# Patient Record
Sex: MALE | Race: WHITE | Employment: OTHER | URBAN - METROPOLITAN AREA
[De-identification: names, ages, dates, MRNs, and addresses within clinical notes are randomized per-mention and may not be internally consistent; named-entity substitution may affect disease eponyms.]

---

## 2017-12-05 PROBLEM — C61 PROSTATE CANCER (HCC): Status: ACTIVE | Noted: 2017-12-05

## 2018-01-11 ENCOUNTER — HOSPITAL ENCOUNTER (OUTPATIENT)
Dept: RADIATION THERAPY | Age: 63
Discharge: HOME | End: 2018-01-11
Payer: OTHER GOVERNMENT

## 2018-01-11 PROCEDURE — 99211 OFF/OP EST MAY X REQ PHY/QHP: CPT

## 2018-02-06 ENCOUNTER — ANESTHESIA EVENT (OUTPATIENT)
Dept: SURGERY | Age: 63
DRG: 708 | End: 2018-02-06
Payer: OTHER GOVERNMENT

## 2018-02-07 ENCOUNTER — ANESTHESIA (OUTPATIENT)
Dept: SURGERY | Age: 63
DRG: 708 | End: 2018-02-07
Payer: OTHER GOVERNMENT

## 2018-02-07 ENCOUNTER — HOSPITAL ENCOUNTER (INPATIENT)
Age: 63
LOS: 1 days | Discharge: HOME | DRG: 708 | End: 2018-02-08
Attending: UROLOGY | Admitting: UROLOGY
Payer: OTHER GOVERNMENT

## 2018-02-07 DIAGNOSIS — C61 PROSTATE CANCER (HCC): Primary | ICD-10-CM

## 2018-02-07 PROCEDURE — 77030032490 HC SLV COMPR SCD KNE COVD -B: Performed by: UROLOGY

## 2018-02-07 PROCEDURE — 77030034696 HC CATH URETH FOL 2W BARD -A: Performed by: UROLOGY

## 2018-02-07 PROCEDURE — 77030035045 HC TRCR ENDOSC VRSPRT BLDLSS COVD -B: Performed by: UROLOGY

## 2018-02-07 PROCEDURE — 74011000250 HC RX REV CODE- 250: Performed by: UROLOGY

## 2018-02-07 PROCEDURE — 77030016151 HC PROTCTR LNS DFOG COVD -B: Performed by: UROLOGY

## 2018-02-07 PROCEDURE — 88309 TISSUE EXAM BY PATHOLOGIST: CPT | Performed by: UROLOGY

## 2018-02-07 PROCEDURE — 77030010935 HC CLP LIG ABSRB TELE -B: Performed by: UROLOGY

## 2018-02-07 PROCEDURE — 77030008771 HC TU NG SALEM SUMP -A: Performed by: ANESTHESIOLOGY

## 2018-02-07 PROCEDURE — 77030010507 HC ADH SKN DERMBND J&J -B: Performed by: UROLOGY

## 2018-02-07 PROCEDURE — 74011000250 HC RX REV CODE- 250

## 2018-02-07 PROCEDURE — 74011250636 HC RX REV CODE- 250/636: Performed by: UROLOGY

## 2018-02-07 PROCEDURE — 77030035048 HC TRCR ENDOSC OPTCL COVD -B: Performed by: UROLOGY

## 2018-02-07 PROCEDURE — 76010000880 HC OR TIME 4 TO 4.5HR INTENSV - TIER 2: Performed by: UROLOGY

## 2018-02-07 PROCEDURE — 77030018813 HC SCIS LAPSCP EPIX DISP AMR -B: Performed by: UROLOGY

## 2018-02-07 PROCEDURE — 0TQC4ZZ REPAIR BLADDER NECK, PERCUTANEOUS ENDOSCOPIC APPROACH: ICD-10-PCS | Performed by: UROLOGY

## 2018-02-07 PROCEDURE — 77030002933 HC SUT MCRYL J&J -A: Performed by: UROLOGY

## 2018-02-07 PROCEDURE — 07BC4ZZ EXCISION OF PELVIS LYMPHATIC, PERCUTANEOUS ENDOSCOPIC APPROACH: ICD-10-PCS | Performed by: UROLOGY

## 2018-02-07 PROCEDURE — 74011250636 HC RX REV CODE- 250/636

## 2018-02-07 PROCEDURE — 77030003028 HC SUT VCRL J&J -A: Performed by: UROLOGY

## 2018-02-07 PROCEDURE — 77030013079 HC BLNKT BAIR HGGR 3M -A: Performed by: ANESTHESIOLOGY

## 2018-02-07 PROCEDURE — 77030011640 HC PAD GRND REM COVD -A: Performed by: UROLOGY

## 2018-02-07 PROCEDURE — 77030010545: Performed by: UROLOGY

## 2018-02-07 PROCEDURE — 77030010939 HC CLP LIG TELE -B: Performed by: UROLOGY

## 2018-02-07 PROCEDURE — 77030035277 HC OBTRTR BLDELSS DISP INTU -B: Performed by: UROLOGY

## 2018-02-07 PROCEDURE — 0VBQ4ZZ EXCISION OF BILATERAL VAS DEFERENS, PERCUTANEOUS ENDOSCOPIC APPROACH: ICD-10-PCS | Performed by: UROLOGY

## 2018-02-07 PROCEDURE — 76210000000 HC OR PH I REC 2 TO 2.5 HR: Performed by: UROLOGY

## 2018-02-07 PROCEDURE — 77030020263 HC SOL INJ SOD CL0.9% LFCR 1000ML: Performed by: UROLOGY

## 2018-02-07 PROCEDURE — 74011250637 HC RX REV CODE- 250/637: Performed by: UROLOGY

## 2018-02-07 PROCEDURE — 8E0W4CZ ROBOTIC ASSISTED PROCEDURE OF TRUNK REGION, PERCUTANEOUS ENDOSCOPIC APPROACH: ICD-10-PCS | Performed by: UROLOGY

## 2018-02-07 PROCEDURE — 74011250636 HC RX REV CODE- 250/636: Performed by: NURSE ANESTHETIST, CERTIFIED REGISTERED

## 2018-02-07 PROCEDURE — 77030020255 HC SOL INJ LR 1000ML BG

## 2018-02-07 PROCEDURE — 77030008477 HC STYL SATN SLP COVD -A: Performed by: ANESTHESIOLOGY

## 2018-02-07 PROCEDURE — 74011250637 HC RX REV CODE- 250/637: Performed by: NURSE ANESTHETIST, CERTIFIED REGISTERED

## 2018-02-07 PROCEDURE — 77030027138 HC INCENT SPIROMETER -A: Performed by: UROLOGY

## 2018-02-07 PROCEDURE — 76060000039 HC ANESTHESIA 4 TO 4.5 HR: Performed by: UROLOGY

## 2018-02-07 PROCEDURE — 77030009852 HC PCH RTVR ENDOSC COVD -B: Performed by: UROLOGY

## 2018-02-07 PROCEDURE — 77030002966 HC SUT PDS J&J -A: Performed by: UROLOGY

## 2018-02-07 PROCEDURE — 77030031139 HC SUT VCRL2 J&J -A: Performed by: UROLOGY

## 2018-02-07 PROCEDURE — 0VT04ZZ RESECTION OF PROSTATE, PERCUTANEOUS ENDOSCOPIC APPROACH: ICD-10-PCS | Performed by: UROLOGY

## 2018-02-07 PROCEDURE — 77030022704 HC SUT VLOC COVD -B: Performed by: UROLOGY

## 2018-02-07 PROCEDURE — 65270000029 HC RM PRIVATE

## 2018-02-07 PROCEDURE — 77030010513 HC APPL CLP LIG J&J -C: Performed by: UROLOGY

## 2018-02-07 PROCEDURE — 77030013449 HC CLP LIG TELE -A: Performed by: UROLOGY

## 2018-02-07 PROCEDURE — 88305 TISSUE EXAM BY PATHOLOGIST: CPT | Performed by: UROLOGY

## 2018-02-07 PROCEDURE — 77030035050: Performed by: UROLOGY

## 2018-02-07 PROCEDURE — 0VT34ZZ RESECTION OF BILATERAL SEMINAL VESICLES, PERCUTANEOUS ENDOSCOPIC APPROACH: ICD-10-PCS | Performed by: UROLOGY

## 2018-02-07 PROCEDURE — 77030008683 HC TU ET CUF COVD -A: Performed by: ANESTHESIOLOGY

## 2018-02-07 PROCEDURE — 77030018846 HC SOL IRR STRL H20 ICUM -A: Performed by: UROLOGY

## 2018-02-07 RX ORDER — HYDROMORPHONE HYDROCHLORIDE 2 MG/ML
INJECTION, SOLUTION INTRAMUSCULAR; INTRAVENOUS; SUBCUTANEOUS AS NEEDED
Status: DISCONTINUED | OUTPATIENT
Start: 2018-02-07 | End: 2018-02-07 | Stop reason: HOSPADM

## 2018-02-07 RX ORDER — OXYBUTYNIN CHLORIDE 5 MG/1
5 TABLET ORAL
Status: DISCONTINUED | OUTPATIENT
Start: 2018-02-07 | End: 2018-02-08 | Stop reason: HOSPADM

## 2018-02-07 RX ORDER — BUPIVACAINE HYDROCHLORIDE 2.5 MG/ML
INJECTION, SOLUTION EPIDURAL; INFILTRATION; INTRACAUDAL AS NEEDED
Status: DISCONTINUED | OUTPATIENT
Start: 2018-02-07 | End: 2018-02-07 | Stop reason: HOSPADM

## 2018-02-07 RX ORDER — FENTANYL CITRATE 50 UG/ML
25 INJECTION, SOLUTION INTRAMUSCULAR; INTRAVENOUS
Status: DISCONTINUED | OUTPATIENT
Start: 2018-02-07 | End: 2018-02-07

## 2018-02-07 RX ORDER — ONDANSETRON 2 MG/ML
4 INJECTION INTRAMUSCULAR; INTRAVENOUS
Status: DISCONTINUED | OUTPATIENT
Start: 2018-02-07 | End: 2018-02-07

## 2018-02-07 RX ORDER — ATORVASTATIN CALCIUM 10 MG/1
TABLET, FILM COATED ORAL DAILY
COMMUNITY

## 2018-02-07 RX ORDER — SODIUM CHLORIDE 0.9 % (FLUSH) 0.9 %
5-10 SYRINGE (ML) INJECTION EVERY 8 HOURS
Status: DISCONTINUED | OUTPATIENT
Start: 2018-02-07 | End: 2018-02-08 | Stop reason: HOSPADM

## 2018-02-07 RX ORDER — OXYCODONE AND ACETAMINOPHEN 5; 325 MG/1; MG/1
1 TABLET ORAL AS NEEDED
Status: DISCONTINUED | OUTPATIENT
Start: 2018-02-07 | End: 2018-02-07

## 2018-02-07 RX ORDER — ONDANSETRON 2 MG/ML
INJECTION INTRAMUSCULAR; INTRAVENOUS AS NEEDED
Status: DISCONTINUED | OUTPATIENT
Start: 2018-02-07 | End: 2018-02-07 | Stop reason: HOSPADM

## 2018-02-07 RX ORDER — DOCUSATE SODIUM 100 MG/1
100 CAPSULE, LIQUID FILLED ORAL DAILY
Qty: 30 CAP | Refills: 0 | Status: SHIPPED | OUTPATIENT
Start: 2018-02-07 | End: 2018-03-09

## 2018-02-07 RX ORDER — NALOXONE HYDROCHLORIDE 0.4 MG/ML
0.4 INJECTION, SOLUTION INTRAMUSCULAR; INTRAVENOUS; SUBCUTANEOUS AS NEEDED
Status: DISCONTINUED | OUTPATIENT
Start: 2018-02-07 | End: 2018-02-08 | Stop reason: HOSPADM

## 2018-02-07 RX ORDER — FAMOTIDINE 20 MG/1
20 TABLET, FILM COATED ORAL ONCE
Status: COMPLETED | OUTPATIENT
Start: 2018-02-07 | End: 2018-02-07

## 2018-02-07 RX ORDER — METOPROLOL TARTRATE 5 MG/5ML
5 INJECTION INTRAVENOUS
Status: DISCONTINUED | OUTPATIENT
Start: 2018-02-07 | End: 2018-02-08 | Stop reason: HOSPADM

## 2018-02-07 RX ORDER — DIPHENHYDRAMINE HYDROCHLORIDE 50 MG/ML
25 INJECTION, SOLUTION INTRAMUSCULAR; INTRAVENOUS
Status: DISCONTINUED | OUTPATIENT
Start: 2018-02-07 | End: 2018-02-07

## 2018-02-07 RX ORDER — HEPARIN SODIUM 5000 [USP'U]/ML
5000 INJECTION, SOLUTION INTRAVENOUS; SUBCUTANEOUS EVERY 8 HOURS
Status: DISCONTINUED | OUTPATIENT
Start: 2018-02-07 | End: 2018-02-08 | Stop reason: HOSPADM

## 2018-02-07 RX ORDER — DEXAMETHASONE SODIUM PHOSPHATE 4 MG/ML
INJECTION, SOLUTION INTRA-ARTICULAR; INTRALESIONAL; INTRAMUSCULAR; INTRAVENOUS; SOFT TISSUE AS NEEDED
Status: DISCONTINUED | OUTPATIENT
Start: 2018-02-07 | End: 2018-02-07 | Stop reason: HOSPADM

## 2018-02-07 RX ORDER — SODIUM CHLORIDE, SODIUM LACTATE, POTASSIUM CHLORIDE, CALCIUM CHLORIDE 600; 310; 30; 20 MG/100ML; MG/100ML; MG/100ML; MG/100ML
INJECTION, SOLUTION INTRAVENOUS
Status: DISCONTINUED | OUTPATIENT
Start: 2018-02-07 | End: 2018-02-07 | Stop reason: HOSPADM

## 2018-02-07 RX ORDER — DIPHENHYDRAMINE HCL 25 MG
25 CAPSULE ORAL
Status: DISCONTINUED | OUTPATIENT
Start: 2018-02-07 | End: 2018-02-08 | Stop reason: HOSPADM

## 2018-02-07 RX ORDER — ONDANSETRON 2 MG/ML
4 INJECTION INTRAMUSCULAR; INTRAVENOUS
Status: DISCONTINUED | OUTPATIENT
Start: 2018-02-07 | End: 2018-02-08 | Stop reason: HOSPADM

## 2018-02-07 RX ORDER — OXYBUTYNIN CHLORIDE 5 MG/1
5 TABLET ORAL
Qty: 20 TAB | Refills: 0 | Status: SHIPPED | OUTPATIENT
Start: 2018-02-07 | End: 2018-03-19

## 2018-02-07 RX ORDER — OXYCODONE AND ACETAMINOPHEN 5; 325 MG/1; MG/1
1 TABLET ORAL
Qty: 30 TAB | Refills: 0 | Status: SHIPPED | OUTPATIENT
Start: 2018-02-07 | End: 2018-03-19

## 2018-02-07 RX ORDER — LIDOCAINE HYDROCHLORIDE 10 MG/ML
0.1 INJECTION, SOLUTION EPIDURAL; INFILTRATION; INTRACAUDAL; PERINEURAL AS NEEDED
Status: DISCONTINUED | OUTPATIENT
Start: 2018-02-07 | End: 2018-02-07 | Stop reason: HOSPADM

## 2018-02-07 RX ORDER — HYDROCODONE BITARTRATE AND ACETAMINOPHEN 5; 325 MG/1; MG/1
1-2 TABLET ORAL
Status: DISCONTINUED | OUTPATIENT
Start: 2018-02-07 | End: 2018-02-08 | Stop reason: HOSPADM

## 2018-02-07 RX ORDER — HYDROMORPHONE HYDROCHLORIDE 1 MG/ML
.5-1 INJECTION, SOLUTION INTRAMUSCULAR; INTRAVENOUS; SUBCUTANEOUS
Status: DISCONTINUED | OUTPATIENT
Start: 2018-02-07 | End: 2018-02-08 | Stop reason: HOSPADM

## 2018-02-07 RX ORDER — SODIUM CHLORIDE, SODIUM LACTATE, POTASSIUM CHLORIDE, CALCIUM CHLORIDE 600; 310; 30; 20 MG/100ML; MG/100ML; MG/100ML; MG/100ML
50 INJECTION, SOLUTION INTRAVENOUS CONTINUOUS
Status: DISCONTINUED | OUTPATIENT
Start: 2018-02-07 | End: 2018-02-07

## 2018-02-07 RX ORDER — HYDROMORPHONE HYDROCHLORIDE 2 MG/ML
0.5 INJECTION, SOLUTION INTRAMUSCULAR; INTRAVENOUS; SUBCUTANEOUS
Status: DISCONTINUED | OUTPATIENT
Start: 2018-02-07 | End: 2018-02-07

## 2018-02-07 RX ORDER — GLYCOPYRROLATE 0.2 MG/ML
INJECTION INTRAMUSCULAR; INTRAVENOUS AS NEEDED
Status: DISCONTINUED | OUTPATIENT
Start: 2018-02-07 | End: 2018-02-07 | Stop reason: HOSPADM

## 2018-02-07 RX ORDER — LISINOPRIL AND HYDROCHLOROTHIAZIDE 20; 25 MG/1; MG/1
1 TABLET ORAL DAILY
COMMUNITY
End: 2021-02-03

## 2018-02-07 RX ORDER — LIDOCAINE HYDROCHLORIDE 20 MG/ML
INJECTION, SOLUTION EPIDURAL; INFILTRATION; INTRACAUDAL; PERINEURAL AS NEEDED
Status: DISCONTINUED | OUTPATIENT
Start: 2018-02-07 | End: 2018-02-07 | Stop reason: HOSPADM

## 2018-02-07 RX ORDER — SODIUM CHLORIDE 0.9 % (FLUSH) 0.9 %
5-10 SYRINGE (ML) INJECTION AS NEEDED
Status: DISCONTINUED | OUTPATIENT
Start: 2018-02-07 | End: 2018-02-08 | Stop reason: HOSPADM

## 2018-02-07 RX ORDER — OXYBUTYNIN CHLORIDE 5 MG/1
5 TABLET ORAL
Status: DISCONTINUED | OUTPATIENT
Start: 2018-02-07 | End: 2018-02-07 | Stop reason: SDUPTHER

## 2018-02-07 RX ORDER — NEOSTIGMINE METHYLSULFATE 5 MG/5 ML
SYRINGE (ML) INTRAVENOUS AS NEEDED
Status: DISCONTINUED | OUTPATIENT
Start: 2018-02-07 | End: 2018-02-07 | Stop reason: HOSPADM

## 2018-02-07 RX ORDER — SUCCINYLCHOLINE CHLORIDE 20 MG/ML
INJECTION INTRAMUSCULAR; INTRAVENOUS AS NEEDED
Status: DISCONTINUED | OUTPATIENT
Start: 2018-02-07 | End: 2018-02-07 | Stop reason: HOSPADM

## 2018-02-07 RX ORDER — SODIUM CHLORIDE, SODIUM LACTATE, POTASSIUM CHLORIDE, CALCIUM CHLORIDE 600; 310; 30; 20 MG/100ML; MG/100ML; MG/100ML; MG/100ML
125 INJECTION, SOLUTION INTRAVENOUS CONTINUOUS
Status: DISCONTINUED | OUTPATIENT
Start: 2018-02-07 | End: 2018-02-08 | Stop reason: HOSPADM

## 2018-02-07 RX ORDER — HYDROMORPHONE HYDROCHLORIDE 2 MG/ML
INJECTION, SOLUTION INTRAMUSCULAR; INTRAVENOUS; SUBCUTANEOUS
Status: COMPLETED
Start: 2018-02-07 | End: 2018-02-07

## 2018-02-07 RX ORDER — MIDAZOLAM HYDROCHLORIDE 1 MG/ML
INJECTION, SOLUTION INTRAMUSCULAR; INTRAVENOUS AS NEEDED
Status: DISCONTINUED | OUTPATIENT
Start: 2018-02-07 | End: 2018-02-07 | Stop reason: HOSPADM

## 2018-02-07 RX ORDER — VECURONIUM BROMIDE FOR INJECTION 1 MG/ML
INJECTION, POWDER, LYOPHILIZED, FOR SOLUTION INTRAVENOUS AS NEEDED
Status: DISCONTINUED | OUTPATIENT
Start: 2018-02-07 | End: 2018-02-07 | Stop reason: HOSPADM

## 2018-02-07 RX ORDER — PROPOFOL 10 MG/ML
INJECTION, EMULSION INTRAVENOUS AS NEEDED
Status: DISCONTINUED | OUTPATIENT
Start: 2018-02-07 | End: 2018-02-07 | Stop reason: HOSPADM

## 2018-02-07 RX ORDER — FENTANYL CITRATE 50 UG/ML
INJECTION, SOLUTION INTRAMUSCULAR; INTRAVENOUS AS NEEDED
Status: DISCONTINUED | OUTPATIENT
Start: 2018-02-07 | End: 2018-02-07 | Stop reason: HOSPADM

## 2018-02-07 RX ORDER — SODIUM CHLORIDE, SODIUM LACTATE, POTASSIUM CHLORIDE, CALCIUM CHLORIDE 600; 310; 30; 20 MG/100ML; MG/100ML; MG/100ML; MG/100ML
25 INJECTION, SOLUTION INTRAVENOUS CONTINUOUS
Status: DISCONTINUED | OUTPATIENT
Start: 2018-02-07 | End: 2018-02-07 | Stop reason: HOSPADM

## 2018-02-07 RX ADMIN — VECURONIUM BROMIDE FOR INJECTION 5 MG: 1 INJECTION, POWDER, LYOPHILIZED, FOR SOLUTION INTRAVENOUS at 09:55

## 2018-02-07 RX ADMIN — FAMOTIDINE 20 MG: 20 TABLET, FILM COATED ORAL at 08:00

## 2018-02-07 RX ADMIN — VECURONIUM BROMIDE FOR INJECTION 1 MG: 1 INJECTION, POWDER, LYOPHILIZED, FOR SOLUTION INTRAVENOUS at 10:47

## 2018-02-07 RX ADMIN — OXYBUTYNIN CHLORIDE 5 MG: 5 TABLET ORAL at 23:23

## 2018-02-07 RX ADMIN — FENTANYL CITRATE 50 MCG: 50 INJECTION, SOLUTION INTRAMUSCULAR; INTRAVENOUS at 10:24

## 2018-02-07 RX ADMIN — SODIUM CHLORIDE, SODIUM LACTATE, POTASSIUM CHLORIDE, CALCIUM CHLORIDE: 600; 310; 30; 20 INJECTION, SOLUTION INTRAVENOUS at 09:50

## 2018-02-07 RX ADMIN — GLYCOPYRROLATE 0.2 MG: 0.2 INJECTION INTRAMUSCULAR; INTRAVENOUS at 10:07

## 2018-02-07 RX ADMIN — FENTANYL CITRATE 50 MCG: 50 INJECTION, SOLUTION INTRAMUSCULAR; INTRAVENOUS at 10:30

## 2018-02-07 RX ADMIN — WATER 1 G: 1 INJECTION INTRAMUSCULAR; INTRAVENOUS; SUBCUTANEOUS at 16:57

## 2018-02-07 RX ADMIN — PROPOFOL 150 MG: 10 INJECTION, EMULSION INTRAVENOUS at 09:47

## 2018-02-07 RX ADMIN — HEPARIN SODIUM 5000 UNITS: 5000 INJECTION, SOLUTION INTRAVENOUS; SUBCUTANEOUS at 15:52

## 2018-02-07 RX ADMIN — HYDROCODONE BITARTRATE AND ACETAMINOPHEN 2 TABLET: 5; 325 TABLET ORAL at 20:11

## 2018-02-07 RX ADMIN — HYDROCODONE BITARTRATE AND ACETAMINOPHEN 1 TABLET: 5; 325 TABLET ORAL at 23:23

## 2018-02-07 RX ADMIN — Medication 2 MG: at 13:13

## 2018-02-07 RX ADMIN — LIDOCAINE HYDROCHLORIDE 60 MG: 20 INJECTION, SOLUTION EPIDURAL; INFILTRATION; INTRACAUDAL; PERINEURAL at 09:47

## 2018-02-07 RX ADMIN — Medication 10 ML: at 16:57

## 2018-02-07 RX ADMIN — GLYCOPYRROLATE 0.2 MG: 0.2 INJECTION INTRAMUSCULAR; INTRAVENOUS at 13:13

## 2018-02-07 RX ADMIN — DEXAMETHASONE SODIUM PHOSPHATE 4 MG: 4 INJECTION, SOLUTION INTRA-ARTICULAR; INTRALESIONAL; INTRAMUSCULAR; INTRAVENOUS; SOFT TISSUE at 10:00

## 2018-02-07 RX ADMIN — HYDROMORPHONE HYDROCHLORIDE 1 MG: 2 INJECTION, SOLUTION INTRAMUSCULAR; INTRAVENOUS; SUBCUTANEOUS at 10:55

## 2018-02-07 RX ADMIN — FENTANYL CITRATE 100 MCG: 50 INJECTION, SOLUTION INTRAMUSCULAR; INTRAVENOUS at 09:47

## 2018-02-07 RX ADMIN — WATER 1 G: 1 INJECTION INTRAMUSCULAR; INTRAVENOUS; SUBCUTANEOUS at 22:19

## 2018-02-07 RX ADMIN — MIDAZOLAM HYDROCHLORIDE 2 MG: 1 INJECTION, SOLUTION INTRAMUSCULAR; INTRAVENOUS at 09:37

## 2018-02-07 RX ADMIN — SUCCINYLCHOLINE CHLORIDE 100 MG: 20 INJECTION INTRAMUSCULAR; INTRAVENOUS at 09:47

## 2018-02-07 RX ADMIN — SODIUM CHLORIDE, SODIUM LACTATE, POTASSIUM CHLORIDE, AND CALCIUM CHLORIDE 125 ML/HR: 600; 310; 30; 20 INJECTION, SOLUTION INTRAVENOUS at 15:51

## 2018-02-07 RX ADMIN — VECURONIUM BROMIDE FOR INJECTION 1 MG: 1 INJECTION, POWDER, LYOPHILIZED, FOR SOLUTION INTRAVENOUS at 11:30

## 2018-02-07 RX ADMIN — HYDROMORPHONE HYDROCHLORIDE 0.5 MG: 2 INJECTION INTRAMUSCULAR; INTRAVENOUS; SUBCUTANEOUS at 13:49

## 2018-02-07 RX ADMIN — WATER 2 G: 1 INJECTION INTRAMUSCULAR; INTRAVENOUS; SUBCUTANEOUS at 10:00

## 2018-02-07 RX ADMIN — HYDROMORPHONE HYDROCHLORIDE 1 MG: 2 INJECTION, SOLUTION INTRAMUSCULAR; INTRAVENOUS; SUBCUTANEOUS at 10:06

## 2018-02-07 RX ADMIN — SODIUM CHLORIDE, POTASSIUM CHLORIDE, SODIUM LACTATE AND CALCIUM CHLORIDE 25 ML/HR: 600; 310; 30; 20 INJECTION, SOLUTION INTRAVENOUS at 08:22

## 2018-02-07 RX ADMIN — ONDANSETRON 4 MG: 2 INJECTION INTRAMUSCULAR; INTRAVENOUS at 18:45

## 2018-02-07 RX ADMIN — HYDROMORPHONE HYDROCHLORIDE 0.5 MG: 2 INJECTION, SOLUTION INTRAMUSCULAR; INTRAVENOUS; SUBCUTANEOUS at 14:00

## 2018-02-07 RX ADMIN — HEPARIN SODIUM 5000 UNITS: 5000 INJECTION, SOLUTION INTRAVENOUS; SUBCUTANEOUS at 23:22

## 2018-02-07 RX ADMIN — HYDROMORPHONE HYDROCHLORIDE 0.5 MG: 2 INJECTION, SOLUTION INTRAMUSCULAR; INTRAVENOUS; SUBCUTANEOUS at 13:49

## 2018-02-07 RX ADMIN — ONDANSETRON 4 MG: 2 INJECTION INTRAMUSCULAR; INTRAVENOUS at 13:09

## 2018-02-07 RX ADMIN — HYDROCODONE BITARTRATE AND ACETAMINOPHEN 2 TABLET: 5; 325 TABLET ORAL at 15:51

## 2018-02-07 RX ADMIN — HYDROMORPHONE HYDROCHLORIDE 0.5 MG: 2 INJECTION, SOLUTION INTRAMUSCULAR; INTRAVENOUS; SUBCUTANEOUS at 14:45

## 2018-02-07 RX ADMIN — Medication 10 ML: at 22:00

## 2018-02-07 RX ADMIN — VECURONIUM BROMIDE FOR INJECTION 1 MG: 1 INJECTION, POWDER, LYOPHILIZED, FOR SOLUTION INTRAVENOUS at 12:03

## 2018-02-07 RX ADMIN — OXYBUTYNIN CHLORIDE 5 MG: 5 TABLET ORAL at 15:51

## 2018-02-07 NOTE — H&P
Nimo Form  1/3/2018        ASSESSMENT:      ICD-10-CM ICD-9-CM     1. [facility] ([facility] C61 185 AMB POC URINALYSIS DIP STICK AUTO W/O MICRO         Prostate Cancer     PLAN:     Long discussion of options.     The patient had all treatment options presented to him including active surveillance, hormonal therapy, cryosurgery, radiation, and surgical alternatives. He has had ample time to ask questions. I went through our Quality of Life Data comparing numerous treatment modalities. I went through the data surrounding each modality. We discussed continence data and the fact that recovery is on a bell shape curve - some men recovering early, some men later. Additionally, he understands that disabling incontinence requiring an external sphincter is very rare. He understands that milder form so urinary incontinence does exist and may in fact persist after 12 months. A vast majority of our patient use a safety pad or less. Finally, I reviewed the [employer] Nomograms for each treatment modality.       ROBOTIC PROSTATECTOMY CONSULTATION SURGERY RISK:     Today we discussed the risks and complications of robotic assisted radical prostatectomy. We discussed our program. We discussed our quality of life outcome data and we discussed risks and complications of the procedure including, but not limited to bleeding, infection, possible impotence, possible incontinence, possible injury to the rectum requiring the need for a colostomy or ileostomy, need for open conversion, transfusion, injury to adjacent structures, blood vessels, organs, abdominal viscera, bladder neck contracture, urethral stricture, persistent or recurrent disease requiring the need for adjuvant therapy, positioning neuropathies, and anesthetic complications. He understands these risks and complications and is willing to proceed.                         Discussion:  Explanation of procedure:    Luis Fernando Robotic Radical Prostatectomy (DVP) is outlined in detail for the patient today. Patient understands the risks and benefits of this surgery.       Risks of DVP:  The patient had all treatment options presented to him including active surveillance, hormonal therapy, cryosurgery, radiation, and surgical alternatives. He has had ample time to ask questions. I went through our Quality of Life Data comparing numerous treatment modalities. I went through the data surrounding each modality. We discussed continence data and the fact that recovery is on a bell shape curve - some men recovering early, some men later. Additionally, he understands that disabling incontinence requiring an external sphincter is very rare. He understands that milder form so urinary incontinence does exist and may in fact persist after 12 months. A vast majority of our patient use a safety pad or less. Finally, I reviewed the [employer] Nomograms for each treatment modality.       ROBOTIC PROSTATECTOMY CONSULTATION SURGERY RISK:     Today we discussed the risks and complications of robotic assisted radical prostatectomy. We discussed our program. We discussed our quality of life outcome data and we discussed risks and complications of the procedure including, but not limited to bleeding, infection, possible impotence, possible incontinence, possible injury to the rectum requiring the need for a colostomy or ileostomy, need for open conversion, transfusion, injury to adjacent structures, blood vessels, organs, abdominal viscera, bladder neck contracture, urethral stricture, persistent or recurrent disease requiring the need for adjuvant therapy, positioning neuropathies, and anesthetic complications. He understands these risks and complications and is willing to proceed.       Stopping ASA products:   Patient is advised not to take aspirin or aspirin containing products such as Bufferin, Excedrin, Motrin or Ibuprofen prior to the procedure.   Patient will stop all medications 7-10 days prior to the scheduled procedure and has been given a detailed list of these medications. Patient understands the rationale behind stopping these medications.       Preoperative comments: The DVP should take approximately 3 hours. Patient is advised to have nothing to eat or drink after midnight the night prior to the procedure. Questions were answered for the patient.        Postoperative comments:  Patient should expect to be discharged the following day after the procedure, unless informed otherwise. Postoperative expectations were discussed with the patient.         Plan:  1. Risks, benefits discussed. All questions answered. Patient indicated desire to proceed. 2. Pre-operative consent forms have been reviewed with the patient and signed. 3.       Follow-up Disposition: Not on File         Orders Placed This Encounter    AMB POC URINALYSIS DIP STICK AUTO W/O MICRO            DISCUSSION:        I have personally reviewed and updated the history, past medical history, past surgical history, family history, medications, and review of systems. The changes noted have been documented in the following note and updated from the previous encounter.        SUBJECTIVE:         Chief Complaint   Patient presents with    Prostate Cancer         History of Present Illness:      1. Lulu Grover is a 60 y.o. male who presents to me for evaluation of prostate cancer treatment options. History is as follows:   Vance Vázquez a 60 y. o. male who presents today to review his prostate biopsy pathology results. S/p TRUS biopsy 11/14/17 for bilateral apical firmness on left sided asymmetry on GALI. Pathology revealed [place] Grade 3+4=7 Adneocarcinoma of the Prostate involving 20-30% of 2/12 cores; and Kerri 3+3=6 involving 3-10% of 4/12 cores. Presenting PSA of 4.25ng/ml. TRUS volume 34.8cc.   No significant ED      Baseline voiding symptoms include: nocturia x1 and occasional frequency.       Family history of prostate cancer: Yes- father diagnosed at 67years old.  Darrian Cleaning                   Past Medical History:   Diagnosis Date    Arthritis      Elevated PSA      HTN (hypertension)                 Past Surgical History:   Procedure Laterality Date    HX HERNIA REPAIR         inguinal.              Social History   Substance Use Topics    Smoking status: Never Smoker    Smokeless tobacco: Never Used    Alcohol use Yes         No Known Allergies           Family History   Problem Relation Age of Onset    Hypertension Mother      Cancer Mother      Hypertension Father      Cancer Father                  Current Outpatient Prescriptions   Medication Sig Dispense Refill    ASPIR-LOW 81 mg tablet          lisinopril-hydroCHLOROthiazide (PRINZIDE, ZESTORETIC) 20-12.5 mg per tablet          celecoxib (CELEBREX) 200 mg capsule Take  by mouth two (2) times a day.        loratadine (CLARITIN) 10 mg tablet Take 10 mg by mouth.             Review of Systems  Constitutional: Fever: No  Skin: Rash: No  HEENT: Hearing difficulty: No  Eyes: Blurred vision: No  Cardiovascular: Chest pain: No  Respiratory: Shortness of breath: No  Gastrointestinal: Nausea/vomiting: No  Musculoskeletal: Back pain: No  Neurological: Weakness: No  Psychological: Memory loss: No  Comments/additional findings:   All other systems reviewed and are negative        OBJECTIVE:  Physical Exam:   Visit Vitals    /76      Constitutional: WDWN, Pleasant and appropriate affect, No acute distress. Neck: supple, no bruits or lymphadenopathy    CV:  No peripheral swelling noted  Heart: RRR  Respiratory: No respiratory distress or difficulties  Abdomen:  No abdominal masses or tenderness. No CVA tenderness. No hernias noted. Skin: No jaundice. Neuro/Psych:  Alert and oriented x 3. Affect appropriate. Lymphatic:   No enlarged inguinal lymph nodes.   Skin: warm and dry  Extrem: no motor deficits       Review of Medical Images, tracings or specimens:     Labs Reviewed today with the patient include:            Results for orders placed or performed in visit on 12/22/17   AMB POC URINALYSIS DIP STICK AUTO W/O MICRO   Result Value Ref Range     Color (UA POC)         Clarity (UA POC)         Glucose (UA POC) Negative Negative     Bilirubin (UA POC) Negative Negative     Ketones (UA POC) Negative Negative     Specific gravity (UA POC) 1.025 1.001 - 1.035     Blood (UA POC) Negative Negative     pH (UA POC) 5.0 4.6 - 8.0     Protein (UA POC) Negative Negative     Urobilinogen (UA POC) 0.2 mg/dL 0.2 - 1     Nitrites (UA POC) Negative Negative     Leukocyte esterase (UA POC) Negative Negative         Pathology/Cytology results were reviewed today. Radiology images were not reviewed today.     Will copy note to Dr. Madalyn Gomez for review.        Jesusita Rey.  Peter Mcmanus MD FACS  Professor of Urology   [facility]  [facility]  Director, Endourology Fellowship  [place] [address] [phone] ext [phone] (office)  [phone] fax

## 2018-02-07 NOTE — ANESTHESIA POSTPROCEDURE EVALUATION
Post-Anesthesia Evaluation and Assessment    Patient: Jacquelnie Melara MRN: [id number]  SSN: [id number]    YOB: 1955  Age: 60 y.o. Sex: male       Cardiovascular Function/Vital Signs  Visit Vitals    /66    Pulse 72    Temp 36.2 °C (97.2 °F)    Resp 15    Ht 5' 9.5\" (1.765 m)    Wt 77.3 kg (170 lb 6 oz)    SpO2 100%    BMI 24.8 kg/m2       Patient is status post general anesthesia for Procedure(s):  davinci laparoscopic radical PROSTATECTOMY,  ROBOTIC ASSISTED. Nausea/Vomiting: None    Postoperative hydration reviewed and adequate. Pain:  Pain Scale 1: Numeric (0 - 10) (02/07/18 1445)  Pain Intensity 1: 8 (02/07/18 1445)   Managed    Neurological Status:   Neuro (WDL): Within Defined Limits (02/07/18 0758)   At baseline    Mental Status and Level of Consciousness: Arousable    Pulmonary Status:   O2 Device: Oxygen mask (02/07/18 1342)   Adequate oxygenation and airway patent    Complications related to anesthesia: None    Post-anesthesia assessment completed.  No concerns    Signed By: Anny Guzman MD     February 7, 2018

## 2018-02-07 NOTE — IP AVS SNAPSHOT
[id number] [place], [place] [address] Patient: Shelia Guy MRN: [id number] DANIEL:[birth year] About your hospitalization You were admitted on:  February 7, 2018 You last received care in the:  [address] [place] [address],[address] You were discharged on:  February 8, 2018 Why you were hospitalized Your primary diagnosis was:  Not on File Your diagnoses also included:  Prostate Cancer (Hcc) Follow-up Information Follow up With Details Comments Contact Info Domingo Santo PA-C   [facility] [address] [facility] [address] 
[phone] Osmany Timmons MD  as discussed 301 [facility] [address] [place] [address] 
[phone] Your Scheduled Appointments Monday February 26, 2018  9:45 AM EST  
ESTABLISHED PATIENT with Osmany Timmons MD  
[facility]) 301 [facility], [facility] [address] [place] [address]  
[phone] Monday March 12, 2018  9:00 AM EDT PHYSICAL THERAPY with Jeannie Casillas, PT [facility]rology of [facility]. Tony Cosme 98 ([facility]) [address] [address]  
[phone] Discharge Orders Procedure Order Date Status Priority Quantity Spec Type Associated Dx CALL YOUR DOCTOR For: Temperature greater than 100.4., Persistant nausea and vomiting., Severe uncontrolled pain. , Difficulty breathing, headache, or visual disturbances. , Persistant dizziness or light-headedness. Difficulty with tyson catheter or. .. 02/07/18 1407 Normal Routine 1  Prostate cancer ([facility] [address] [[id number]] Comments:  Difficulty with tyson catheter or tyson catheter not draining Questions: For:  Temperature greater than 100.4. For:  Persistant nausea and vomiting. For:  Severe uncontrolled pain. For:  Difficulty breathing, headache, or visual disturbances. For:  Persistant dizziness or light-headedness. ACTIVITY AFTER DISCHARGE Patient should: Restrict lifting Avoid heavy lifting or strenuous activity for 6 weeks 02/07/18 1407 Normal Routine 1  Prostate cancer ([facility] 75.) [[id number]] Comments:  Avoid heavy lifting or strenuous activity for 6 weeks Questions: Patient should:  Restrict lifting TYSON CATHETER, CARE 02/07/18 1407 Normal Routine 1  Prostate cancer ([facility] 75.) [[id number]] Comments:  Care for your tyson as instructed by nursing. Do not manipulate or pull on catheter. A check lyn indicates which time of day the medication should be taken. My Medications START taking these medications Instructions Each Dose to Equal  
 Morning Noon Evening Bedtime  
 docusate sodium 100 mg capsule Commonly known as:  Rolley Ke Your last dose was: Your next dose is: Take 1 Cap by mouth daily for 30 days. Continue while on narcotic pain medication to help prevent constipation 100 mg  
    
   
   
   
  
 oxybutynin 5 mg tablet Commonly known as:  QKXHRTDI Your last dose was: Your next dose is: Take 1 Tab by mouth three (3) times daily as needed. Indications: Bladder Spasms 5 mg  
    
   
   
   
  
 oxyCODONE-acetaminophen 5-325 mg per tablet Commonly known as:  PERCOCET Your last dose was: Your next dose is: Take 1 Tab by mouth every four (4) hours as needed for Pain. Max Daily Amount: 6 Tabs. May take 2 tabs for severe pain. Avoid driving while on this medication. Do not exceed 4000mg of acetaminophen per day 1 Tab CONTINUE taking these medications Instructions Each Dose to Equal  
 Morning Noon Evening Bedtime ASPIR-LOW 81 mg tablet Generic drug:  aspirin delayed-release Your last dose was: Your next dose is:    
   
   
      
   
   
   
  
 atorvastatin 10 mg tablet Commonly known as:  LIPITOR Your last dose was: Your next dose is: Take  by mouth daily. CeleBREX 200 mg capsule Generic drug:  celecoxib Your last dose was: Your next dose is: Take  by mouth two (2) times a day. CLARITIN 10 mg tablet Generic drug:  loratadine Your last dose was: Your next dose is: Take 10 mg by mouth. 10 mg  
    
   
   
   
  
 lisinopril-hydroCHLOROthiazide 20-25 mg per tablet Commonly known as:  Chuckie Marquis Your last dose was: Your next dose is: Take 1 Tab by mouth daily. 1 Tab Where to Get Your Medications Information on where to get these meds will be given to you by the nurse or doctor. ! Ask your nurse or doctor about these medications  
  docusate sodium 100 mg capsule  
 oxybutynin 5 mg tablet  
 oxyCODONE-acetaminophen 5-325 mg per tablet Discharge Instructions DISCHARGE SUMMARY from Nurse PATIENT INSTRUCTIONS: 
 
 
F-face looks uneven A-arms unable to move or move unevenly S-speech slurred or non-existent T-time-call 911 as soon as signs and symptoms begin-DO NOT go Back to bed or wait to see if you get better-TIME IS BRAIN. Warning Signs of HEART ATTACK Call 911 if you have these symptoms: 
? Chest discomfort.  Most heart attacks involve discomfort in the center of the chest that lasts more than a few minutes, or that goes away and comes back. It can feel like uncomfortable pressure, squeezing, fullness, or pain. ? Discomfort in other areas of the upper body. Symptoms can include pain or discomfort in one or both arms, the back, neck, jaw, or stomach. ? Shortness of breath with or without chest discomfort. ? Other signs may include breaking out in a cold sweat, nausea, or lightheadedness. Don't wait more than five minutes to call [address]! Fast action can save your life. Calling 911 is almost always the fastest way to get lifesaving treatment. Emergency Medical Services staff can begin treatment when they arrive  up to an hour sooner than if someone gets to the hospital by car. The discharge information has been reviewed with the patient. The patient verbalized understanding. Discharge medications reviewed with the patient and appropriate educational materials and side effects teaching were provided. Patient armband removed and shredded. ___________________________________________________________________________________________________________________________________ Tagitohart Announcement We are excited to announce that we are making your provider's discharge notes available to you in Corpora. You will see these notes when they are completed and signed by the physician that discharged you from your recent hospital stay. If you have any questions or concerns about any information you see in Tagitohart, please call the Health Information Department where you were seen or reach out to your Primary Care Provider for more information about your plan of care. Introducing [facility] SERVICES! Dear Teresa Sosa: Thank you for requesting a Corpora account. Our records indicate that you already have an active Corpora account. You can access your account anytime at [email or web address]. Bimici/[employer] Did you know that you can access your hospital and ER discharge instructions at any time in Epicsellhart? You can also review all of your test results from your hospital stay or ER visit. Additional Information If you have questions, please visit the Frequently Asked Questions section of the CliqSearch website at [email or web address] Remember, Epicsellhart is NOT to be used for urgent needs. For medical emergencies, dial 911. Now available from your iPhone and Android! Providers Seen During Your Hospitalization Provider Specialty Primary office phone Osmany Timmons MD Urology [phone] Immunizations Administered for This Admission Name Date Influenza Vaccine (Quad) PF 2/8/2018 Your Primary Care Physician (PCP) Primary Care Physician Office Phone Office Fax Sarah Saeed [phone] You are allergic to the following No active allergies Recent Documentation Height Weight BMI Smoking Status 1.765 m 77.3 kg 24.8 kg/m2 Never Smoker Emergency Contacts Name Discharge Info Relation Home Work Mobile [facility] CAREGIVER [3] Spouse [3] 30-11-89-07 Patient Belongings The following personal items are in your possession at time of discharge: 
  Dental Appliances: None         Home Medications: None   Jewelry: None  Clothing: Jacket/Coat, Undergarments, Footwear, Shirt, Pants    Other Valuables: Eyeglasses Discharge Instructions Attachments/References RADICAL PROSTATECTOMY: LAPAROSCOPIC: POST-OP (ENGLISH) INDWELLING URINARY CATHETER CARE: GENERAL INFO (ENGLISH) LAXATIVE, STOOL SOFTENERS (BY MOUTH) (ENGLISH) OXYBUTYNIN (BY MOUTH) (ENGLISH) OXYCODONE/ACETAMINOPHEN (BY MOUTH) (ENGLISH) Patient Handouts Laparoscopic Radical Prostatectomy: What to Expect at [facility] Your Recovery A laparoscopic radical prostatectomy is surgery to remove the prostate gland. It is done to treat prostate cancer that has not spread beyond the prostate. The doctor made several small cuts, called incisions, in your lower belly. The doctor put a lighted tube (scope) and other surgical tools through the incisions to do the surgery. Or if you had robotic surgery, the doctor guided the robot arms to do this surgery. You may see some bruising and swelling right after your surgery. In the week after surgery, your penis and scrotum may swell. This usually gets better after 1 to 2 weeks. The incisions may be sore for 1 to 2 weeks. Your doctor will give you medicine for pain. You will have a tube (urinary catheter) to drain urine from your bladder for 1 to 2 weeks after surgery. You may have bladder cramps, or spasms, while the catheter is in your bladder. Your doctor can give you medicine to help prevent bladder spasms. After your catheter is removed, it may take several weeks or more for you to control your urine. And it may take 6 months or more for you to be able to have erections again. But with time, most men regain urine control and much of their previous sexual function. If not, medicines or other treatments may help. You will probably be able to go back to work or your usual activities 3 to 5 weeks after surgery. But it can take longer to fully recover. You will need to see your doctor regularly. This includes having blood tests to measure your PSA level. PSA is a substance that can suggest whether your cancer has returned. PSA tests are usually done more often for the first several years after your surgery, but less often after that. Having cancer is scary. You may feel many emotions and need some help coping. It may help to talk with your family, friends, or a therapist about your feelings. You also can do things at home to make yourself feel better while you go through treatment.  Call the [facility]di ([phone]) or visit its Web site at [address] [facility]. [employer] for more information. This care sheet gives you a general idea about how long it will take for you to recover. But each person recovers at a different pace. Follow the steps below to get better as quickly as possible. How can you care for yourself at home? Activity ? · Rest when you feel tired. Getting enough sleep will help you recover. ? · Try to walk each day. Start by walking a little more than you did the day before. Bit by bit, increase the amount you walk. Walking boosts blood flow and helps prevent pneumonia and constipation. At first, avoid hills, and try to stay on flat ground. You can climb stairs, but try to limit how often you do this. When you do climb them, do it slowly, and pause every few steps. ? · Avoid strenuous activities for 2 weeks, or until your doctor says it is okay. This includes bicycle riding, jogging, weight lifting, or aerobic exercise. ? · For 2 to 3 weeks or until your doctor says it is okay, avoid lifting anything that would make you strain. This may include a child, heavy grocery bags and milk containers, a heavy briefcase or backpack, cat litter or dog food bags, or a vacuum . ? · You may shower if your doctor says it is okay. Empty the catheter bag before you start. When you shower, keep the catheter taped to your leg. Do not take a bath until your doctor or nurse has removed the catheter. ? · Ask your doctor when you can drive again. ? · Avoid riding in a car for more than 1 hour at a time for the first 3 weeks after surgery. If you must ride in a car for a longer distance, stop often to walk and stretch your legs. ? · You will probably need to take 3 to 5 weeks off from work. It depends on the type of work you do and how you feel. Diet ? · You can eat your normal diet. If your stomach is upset, try bland, low-fat foods like plain rice, broiled chicken, toast, and yogurt. ? · Drink plenty of fluids (unless your doctor tells you not to). ? · You may notice that your bowel movements are not regular right after your surgery. This is common. Try to avoid constipation and straining with bowel movements. You may want to take a fiber supplement every day. If you have not had a bowel movement after a couple of days, ask your doctor about taking a mild laxative. Medicines ? · Your doctor will tell you if and when you can restart your medicines. He or she will also give you instructions about taking any new medicines. ? · If you take blood thinners, such as warfarin (Coumadin), clopidogrel (Plavix), or aspirin, be sure to talk to your doctor. He or she will tell you if and when to start taking those medicines again. Make sure that you understand exactly what your doctor wants you to do. ? · Be safe with medicines. Take pain medicines exactly as directed. ¨ If the doctor gave you a prescription medicine for pain, take it as prescribed. ¨ If you are not taking a prescription pain medicine, ask your doctor if you can take an over-the-counter medicine. ? · If you think your pain medicine is making you sick to your stomach: 
¨ Take your medicine after meals (unless your doctor has told you not to). ¨ Ask your doctor for a different pain medicine. ? · Your doctor may prescribe antibiotics when your urinary catheter is removed. Take them as directed. Do not stop taking them just because you feel better. You need to take the full course of antibiotics. Incision care ? · If you have strips of tape on the incisions, leave the tape on for a week or until it falls off.  
? · If you had stitches, your doctor will tell you when to come back to have them removed. ? · Wash the area daily with warm water and pat it dry. Don't use hydrogen peroxide or alcohol, which can slow healing. You may cover the area with a gauze bandage if it weeps or rubs against clothing.  Change the bandage every day. ? · Keep the area clean and dry. ?Ice ? · To help with pain and swelling, put ice or a cold pack on your groin for 10 to 20 minutes at a time. Put a thin cloth between the ice and your skin. Other instructions ? · You will have a urinary catheter for 1 to 2 weeks. Your doctor or nurse will tell you how to care for it. ? · Be sure the catheter is securely taped to your thigh and attached to the large drainage bag when you are at home. Use the smaller leg bag only when you go out. ? · A little urine leakage around the catheter is normal. You can place an incontinence pad in your underwear to absorb urine leaks. ? · Do not have an enema or use a rectal thermometer for 3 months, or until your doctor says it is okay. Follow-up care is a key part of your treatment and safety. Be sure to make and go to all appointments, and call your doctor if you are having problems. It's also a good idea to know your test results and keep a list of the medicines you take. When should you call for help? Call 911 anytime you think you may need emergency care. For example, call if: 
? · You passed out (lost consciousness). ? · You have chest pain, are short of breath, or cough up blood. ?Call your doctor now or seek immediate medical care if: 
? · You have pain that does not get better after you take pain medicine. ? · You have loose stitches, or your incision comes open. ? · You are bleeding from the incision. ? · You have signs of infection, such as: 
¨ Increased pain, swelling, warmth, or redness. ¨ Red streaks leading from the area. ¨ Pus draining from the area. ¨ A fever. ? · You are unable to urinate. ? · You have symptoms of a urinary tract infection. These may include: 
¨ Pain or burning when you urinate. ¨ A frequent need to urinate without being able to pass much urine. ¨ Pain in the flank, which is just below the rib cage and above the waist on either side of the back. ¨ Blood in your urine. ¨ A fever. ? · You are sick to your stomach or cannot drink fluids. ? · You have signs of a blood clot in your leg (called a deep vein thrombosis), such as: 
¨ Pain in your calf, back of the knee, thigh, or groin. ¨ Redness or swelling in your leg. ? Watch closely for any changes in your health, and be sure to contact your doctor if you have any problems. Where can you learn more? Go to http://brooke-navdeep.info/. Enter D427 in the search box to learn more about \"Laparoscopic Radical Prostatectomy: What to Expect at Home. \" Current as of: May 12, 2017 Content Version: 11.4 © 4881-9565 [employer]. Care instructions adapted under license by [employer] (which disclaims liability or warranty for this information). If you have questions about a medical condition or this instruction, always ask your healthcare professional. [place] [address] any warranty or liability for your use of this information. Learning About Urinary Catheter Care to Prevent Infection What is a urinary catheter? A urinary catheter is a flexible plastic tube used to drain urine from your bladder when you can't urinate on your own. The catheter allows urine to drain from the bladder into a bag. Two types of drainage bags may be used with a urinary catheter. · A bedside bag is a large bag that you can hang on the side of your bed or on a chair. You can use it overnight or anytime you will be sitting or lying down for a long time. · A leg bag is a small bag that you can use during the day. It is usually attached to your thigh or calf and hidden under your clothes. Having a urinary catheter increases your risk of getting a urinary tract infection. Germs may get on the catheter and cause an infection in your bladder or kidneys.  The longer you have a catheter, the more likely it is that you will get an infection. You can help prevent this problem with good hygiene and careful handling of your catheter and drainage bags. How can you help prevent infection? Take care to be clean · Always wash your hands well before and after you handle your catheter. · Clean the skin around the catheter twice a day using soap and water. Dry with a clean towel afterward. You can shower with your catheter and drainage bag in place unless your doctor told you not to. · When you clean around the catheter, check the surrounding skin for signs of infection. Look for things like pus or irritated, swollen, red, or tender skin around the catheter. Be careful with your drainage bag · Always keep the drainage bag below the level of your bladder. This will help keep urine from flowing back into your bladder. · Check often to see that urine is flowing through the catheter into the drainage bag. · Empty the drainage bag when it is half full. This will keep it from overflowing or backing up. · When you empty the drainage bag, do not let the tubing or drain spout touch anything. Be careful with your catheter · Do not unhook the catheter from the drain tube. That could let germs get into the tube. · Make sure that the catheter tubing does not get twisted or kinked. · Do not tug or pull on the catheter. And make sure that the drainage bag does not drag or pull on the catheter. · Do not put powder or lotion on the skin around the catheter. · Talk with your doctor about your options for sexual intercourse while wearing a catheter. How do you empty a urine drainage bag? If your doctor has asked you to keep a record, write down the amount of urine in the bag before you empty it. Wash your hands before and after you touch the bag. 1. Remove the drain spout from its sleeve at the bottom of the drainage bag. 
2. Open the valve on the drain spout.  Let the urine flow out into the toilet or a container. Be careful not to let the tubing or drain spout touch anything. 3. After you empty the bag, wipe off any liquid on the end of the drain spout. Close the valve. Then put the drain spout back into its sleeve at the bottom of the collection bag. How do you add a bedside bag to a leg bag? Wash your hands before and after you handle the bags. 1. Empty the leg bag attached to the catheter. 2. Put a clean towel under the leg bag. 
3. Use an alcohol wipe to clean the tip of the bedside bag. Then connect the bedside bag to the leg bag. How can you clean a bedside drainage bag? Many people clean their bedside bag in the morning if they switch to a leg bag. To clean a bedside drainage ba. Remove the bedside bag from the leg bag. 
2. Fill the bag with 2 parts vinegar and 3 parts water. Let it stand for 20 minutes. 3. Empty the bag, and let it air dry. When should you call for help? Call your doctor now or seek immediate medical care if: 
· You have symptoms of a urinary infection. These may include: 
¨ Pain or burning when you urinate. ¨ A frequent need to urinate without being able to pass much urine. ¨ Pain in the flank, which is just below the rib cage and above the waist on either side of the back. ¨ Blood in your urine. ¨ A fever. · Your urine smells bad. · You see large blood clots in your urine. · No urine or very little urine is flowing into the bag for 4 or more hours. Watch closely for changes in your health, and be sure to contact your doctor if: · The area around the catheter becomes irritated, swollen, red, or tender, or there is pus draining from it. · Urine is leaking from the place where the catheter enters your body. Follow-up care is a key part of your treatment and safety. Be sure to make and go to all appointments, and call your doctor if you are having problems. It's also a good idea to know your test results and keep a list of the medicines you take. Where can you learn more? Go to http://brooke-navdeep.info/. Enter C910 in the search box to learn more about \"Learning About Urinary Catheter Care to Prevent Infection. \" Current as of: May 12, 2017 Content Version: 11.4 © 9230-3864 [employer]. Care instructions adapted under license by [employer] (which disclaims liability or warranty for this information). If you have questions about a medical condition or this instruction, always ask your healthcare professional. [address] any warranty or liability for your use of this information. Laxative, Stool Softeners (By mouth) Treats constipation by helping you have a bowel movement. Brand Name(s): Col-Rite, Colace, Colace Clear, DSS, Diocto, Diocto Liquid, Doc-Q-Lace, Docuprene, Docusil, Dok, Dulcolax, Fleet Sof-Lax, [facility] Docusate Calcium, Good [facility] Stool Softener, Good Neighbor Stool Softener Extra Strength There may be other brand names for this medicine. When This Medicine Should Not Be Used: You should not use this medicine if you have severe stomach pain, nausea, or vomiting. Stool softeners should not be used if you have severe stomach pain and do not know the cause. How to Use This Medicine:  
Capsule, Tablet, Liquid, Liquid Filled Capsule · Your doctor will tell you how much medicine to use. Do not use more than directed. · Follow the instructions on the medicine label if you are using this medicine without a prescription. · Drink 6 to 8 glasses of water daily while using any laxative. · To make the oral liquid taste better, you may mix it with one-half glass of milk or fruit juice. · Measure the oral liquid medicine with a marked measuring spoon, oral syringe, medicine cup, or medicine dropper. If a dose is missed: · Use the missed dose as soon as possible.  
· If you do not remember the missed dose until the next day, skip the missed dose and go back to your regular dosing schedule. · You should not use two doses at the same time. How to Store and Dispose of This Medicine: · Store the medicine in a tightly closed container at room temperature, away from heat and moisture. Do not store liquid-filled capsules in the refrigerator. · Keep all medicine out of the reach of children. Drugs and Foods to Avoid: Ask your doctor or pharmacist before using any other medicine, including over-the-counter medicines, vitamins, and herbal products. · You should not use mineral oil while you are using a stool softener. · You should not use a stool softener within 2 hours before or after taking any other medicines. Laxatives can keep other medicines from working correctly. Warnings While Using This Medicine: · If you are pregnant or breastfeeding, talk to your doctor before taking this medicine. · Do not give laxatives to children under 10years old unless you talk to your doctor. · You should not use this laxative for longer than 1 week unless approved by your doctor. Laxatives may be habit-forming and can harm your bowels if you use them too long. · Stool softeners usually work in 1 to 2 days, but for some people, results can take as long as 3 to 5 days. Possible Side Effects While Using This Medicine: If you notice these less serious side effects, talk with your doctor: · Nausea · Sore throat · Skin rash If you notice other side effects that you think are caused by this medicine, tell your doctor. Call your doctor for medical advice about side effects. You may report side effects to FDA at 1-831-FDA-3778 © 2017 [address] Information is for End User's use only and may not be sold, redistributed or otherwise used for commercial purposes. The above information is an  only. It is not intended as medical advice for individual conditions or treatments.  Talk to your doctor, nurse or pharmacist before following any medical regimen to see if it is safe and effective for you. Oxybutynin (By mouth) Oxybutynin (uz-z-GBF-ti-nieves) Treats overactive bladder. Brand Name(s): Ditropan XL There may be other brand names for this medicine. When This Medicine Should Not Be Used: This medicine is not right for everyone. Do not use it if you had an allergic reaction to oxybutynin. How to Use This Medicine:  
Liquid, Tablet, Long Acting Tablet · Take your medicine as directed. Your dose may need to be changed several times to find what works best for you. · Oral liquid: Measure the oral liquid medicine with a marked measuring spoon, oral syringe, or medicine cup. · Swallow the extended-release tablet whole. Do not crush, break, or chew it. Take this medicine at the same time each day. · If you take the extended-release tablet, part of the tablet may pass into your stools. This is normal and is nothing to worry about. · Missed dose: Take a dose as soon as you remember. If it is almost time for your next dose, wait until then and take a regular dose. Do not take extra medicine to make up for a missed dose. · Store the medicine in a closed container at room temperature, away from heat, moisture, and direct light. Do not freeze the oral liquid. Drugs and Foods to Avoid: Ask your doctor or pharmacist before using any other medicine, including over-the-counter medicines, vitamins, and herbal products. · Some foods and medicines can affect how oxybutynin works. Tell your doctor if you are using any of the following: ¨ Metoclopramide ¨ Bisphosphonate medicine ¨ Medicine to treat an infection (including clarithromycin, erythromycin, itraconazole, ketoconazole, miconazole) · Tell your doctor if you use anything else that makes you sleepy. Some examples are allergy medicine, narcotic pain medicine, and alcohol. Warnings While Using This Medicine: · Tell your doctor if you are pregnant or breastfeeding, or if you have kidney disease, dementia, glaucoma, heart disease, heart rhythm problems, high blood pressure, myasthenia gravis, Parkinson disease, an enlarged prostate or trouble urinating, or stomach or bowel problems (including colitis, chronic constipation, a bowel blockage, GERD). · This medicine may make you dizzy or drowsy, or cause vision problems. Do not drive or do anything else that could be dangerous until you know how this medicine affects you. · This medicine may make you sweat less. This can cause your body to become too hot. Take precautions if you exercise strenuously or are outside in hot weather. Drink plenty of fluids to stay hydrated. · Your doctor will check your progress and the effects of this medicine at regular visits. Keep all appointments. · Keep all medicine out of the reach of children. Never share your medicine with anyone. Possible Side Effects While Using This Medicine:  
Call your doctor right away if you notice any of these side effects: · Allergic reaction: Itching or hives, swelling in your face or hands, swelling or tingling in your mouth or throat, chest tightness, trouble breathing · Agitation, confusion, unusual behavior or drowsiness, seeing, hearing, or feeling things that are not there · Change in how much or how often you urinate, difficult or painful urination · Hot, dry skin, lack of sweating, weakness If you notice these less serious side effects, talk with your doctor: · Constipation, diarrhea, nausea, heartburn, stomach pain or upset · Dry mouth If you notice other side effects that you think are caused by this medicine, tell your doctor. Call your doctor for medical advice about side effects. You may report side effects to FDA at 2-617-QCG-6741 © 2017 [facility] Information is for End User's use only and may not be sold, redistributed or otherwise used for commercial purposes. The above information is an  only. It is not intended as medical advice for individual conditions or treatments. Talk to your doctor, nurse or pharmacist before following any medical regimen to see if it is safe and effective for you. Oxycodone/Acetaminophen (By mouth) Acetaminophen (m-tpkp-r-MIN-oh-fen), Oxycodone Hydrochloride (gn-g-TXX-done hilario-droe-KLOR-teresa) Treats moderate to moderately severe pain. This medicine is a narcotic pain reliever. Brand Name(s): Endocet, Percocet, Primlev, Xartemis XR There may be other brand names for this medicine. When This Medicine Should Not Be Used: This medicine is not right for everyone. Do not use it if you had an allergic reaction to acetaminophen or oxycodone, or if you have serious breathing problems or paralytic ileus. How to Use This Medicine:  
Capsule, Liquid, Tablet, Long Acting Tablet · Your doctor will tell you how much medicine to use. Do not use more than directed. · An overdose can be dangerous. Follow directions carefully so you do not get too much medicine at one time. · Oral liquid: Measure the oral liquid medicine with a marked measuring spoon, oral syringe, or medicine cup. · Swallow the extended-release tablet whole. Do not crush, break, or chew it. Do not lick or wet the tablet before placing it in your mouth. Do not give this medicine through a feeding tube. · This medicine should come with a Medication Guide. Ask your pharmacist for a copy if you do not have one. · Missed dose: If you miss a dose of this medicine, skip the missed dose and go back to your regular dosing schedule. Do not double doses. · Store the medicine in a closed container at room temperature, away from heat, moisture, and direct light. Ask your pharmacist about the best way to dispose of medicine you do not use. Drugs and Foods to Avoid: Ask your doctor or pharmacist before using any other medicine, including over-the-counter medicines, vitamins, and herbal products. · Do not use Xartemis XR if you are using or have used an MAO inhibitor in the past 14 days. · Some medicines can affect how this medicine works. Tell your doctor if you are using any of the following: ¨ Carbamazepine, erythromycin, ketoconazole, lamotrigine, mirtazapine, naltrexone, phenytoin, propranolol, rifampin, ritonavir, tramadol, trazodone, or zidovudine ¨ Birth control pills ¨ Diuretic (water pill) ¨ Medicine to treat depression ¨ Phenothiazine medicine ¨ Triptan medicine to treat migraine headaches · Do not drink alcohol while you are using this medicine. Acetaminophen can damage your liver, and alcohol can increase this risk. Do not take acetaminophen without asking your doctor if you have 3 or more drinks of alcohol every day. · Tell your doctor if you use anything else that makes you sleepy. Some examples are allergy medicine, narcotic pain medicine, and alcohol. Tell your doctor if you are using buprenorphine, butorphanol, nalbuphine, pentazocine, a benzodiazepine, or a muscle relaxer. Warnings While Using This Medicine: · Tell your doctor if you are pregnant or breastfeeding, or if you have kidney disease, liver disease, heart disease, low blood pressure, breathing problems or lung disease (such as asthma, COPD), thyroid problems, Cyril disease, pancreas or gallbladder problems, prostate problems, trouble urinating, or a stomach problems, or a history of head injury or brain damage, seizures, or alcohol or drug abuse. Tell your doctor if you are allergic to codeine. · This medicine may cause the following problems: 
¨ High risk of overdose, which can lead to death ¨ Respiratory depression (serious breathing problem that can be life-threatening) ¨ Liver problems ¨ Serious skin reactions ¨ Serotonin syndrome (when used with certain medicines) · This medicine may make you dizzy or drowsy.  Do not drive or do anything that could be dangerous until you know how this medicine affects you. Sit or lie down if you feel dizzy. Stand up carefully. · This medicine contains acetaminophen. Read the labels of all other medicines you are using to see if they also contain acetaminophen, or ask your doctor or pharmacist. Jennifer Orellana not use more than 4 grams (4,000 milligrams) total of acetaminophen in one day. · This medicine can be habit-forming. Do not use more than your prescribed dose. Call your doctor if you think your medicine is not working. · Do not stop using this medicine suddenly. Your doctor will need to slowly decrease your dose before you stop it completely. · This medicine could cause infertility. Talk with your doctor before using this medicine if you plan to have children. · This medicine may cause constipation, especially with long-term use. Ask your doctor if you should use a laxative to prevent and treat constipation. · Keep all medicine out of the reach of children. Never share your medicine with anyone. Possible Side Effects While Using This Medicine:  
Call your doctor right away if you notice any of these side effects: · Allergic reaction: Itching or hives, swelling in your face or hands, swelling or tingling in your mouth or throat, chest tightness, trouble breathing · Anxiety, restlessness, fast heartbeat, fever, muscle spasms, twitching, diarrhea, seeing or hearing things that are not there · Blistering, peeling, red skin rash · Blue lips, fingernails, or skin · Dark urine or pale stools, loss of appetite, stomach pain, yellow skin or eyes · Extreme weakness, shallow breathing, uneven heartbeat, seizures, sweating, or cold or clammy skin · Severe confusion, lightheadedness, dizziness, or fainting · Severe constipation, nausea, or vomiting · Trouble breathing or slow breathing If you notice these less serious side effects, talk with your doctor:  
· Headache · Mild constipation, nausea, or vomiting · Mild sleepiness or drowsiness If you notice other side effects that you think are caused by this medicine, tell your doctor. Call your doctor for medical advice about side effects. You may report side effects to FDA at 2-376-FDA-1880 © 2017 [facility] Information is for End User's use only and may not be sold, redistributed or otherwise used for commercial purposes. The above information is an  only. It is not intended as medical advice for individual conditions or treatments. Talk to your doctor, nurse or pharmacist before following any medical regimen to see if it is safe and effective for you. Please provide this summary of care documentation to your next provider. Signatures-by signing, you are acknowledging that this After Visit Summary has been reviewed with you and you have received a copy. Patient Signature:  ____________________________________________________________ Date:  ____________________________________________________________  
  
Randa Linares Provider Signature:  ____________________________________________________________ Date:  ____________________________________________________________

## 2018-02-07 NOTE — PERIOP NOTES
Rec'd care of pt from OR via bed. Resp even and unlabored. Attached to monitor. VSS. OR, MAR and anesthesia report acknowledged. Pearson patent. Will cont to monitor. 0560  TRANSFER - OUT REPORT:    Verbal report given to Kari Novoa RN (name) on Shelia Guy  being transferred to 2200 (unit) for routine post - op       Report consisted of patients Situation, Background, Assessment and   Recommendations(SBAR). Information from the following report(s) SBAR, Kardex, OR Summary, Intake/Output, MAR and Cardiac Rhythm sinus rhythm was reviewed with the receiving nurse. Lines:   Peripheral IV 02/07/18 Left Arm (Active)   Site Assessment Clean, dry, & intact 2/7/2018  8:19 AM   Phlebitis Assessment 0 2/7/2018  8:19 AM   Infiltration Assessment 0 2/7/2018  8:19 AM   Dressing Status Clean, dry, & intact 2/7/2018  8:19 AM   Dressing Type Tape;Transparent 2/7/2018  8:19 AM   Hub Color/Line Status Infusing;Pink 2/7/2018  8:19 AM   Alcohol Cap Used Yes 2/7/2018  8:19 AM       Peripheral IV 02/07/18 Right Forearm (Active)        Opportunity for questions and clarification was provided.       Patient transported with:   [employer]

## 2018-02-07 NOTE — OP NOTES
PATIENT: Francie Jay   MRN [id number]  DATE: 02/07/18    PREOPERATIVE DIAGNOSIS: Adenocarcinoma of the prostate. POSTOPERATIVE DIAGNOSIS: Adenocarcinoma of the prostate. PROCEDURE: Robotic-assisted laparoscopic radical prostatectomy (extrafascial nerve sparing bilaterally) and bladder neck reconstruction. SURGEON: Jeffrey Mendoza MD   ASSISTANT: Mack Rogers RES PGY-4  ESTIMATED BLOOD LOSS: 36IO  COMPLICATIONS: None. CONDITION: Stable in the recovery room. DRAINS: A 16-Stateless Pearson catheter at the end of the procedure. SPECIMENS:  ID Type Source Tests Collected by Time Destination   1 : PERIPROSTATIC LYMPH NODES Preservative   Jeffrey Mendoza MD 2/7/2018 11:02 AM Pathology   2 : Lam Pham MD 2/7/2018 12:58 PM Pathology       ANESTHESIA: General    INDICATIONS: This is a 61 y/o male with GS 3+4=7 Adneocarcinoma of the Prostate involving 20-30% of 2/12 cores; and Kerri 3+3=6 involving 3-10% of 4/12 cores. Presenting PSA of 4.25ng/ml. TRUS volume 34.8cc. Patient who opted for a   robotic-assisted radical prostatectomy for treatment of his prostate cancer. He understood the risks and complications in our program and was willing to proceed. DESCRIPTION OF PROCEDURE: After the patient was identified as the patient, he was brought to the operative suite. He received antibiotic prophylaxis, sequential pneumatic compression devices and preoperative antibiotics. He was placed under general   endotracheal anesthesia. A gastric tube was placed. He was then placed in a low lithotomy position with appropriate upper and lower extremity padding. The table was placed in a steep Trendelenburg position after being secured   to the table. His entire abdomen and genitalia were prepped and draped in the usual sterile fashion. A 20-Stateless Pearson catheter was inserted,   preurethral, and left to gravity drainage.  This was slightly difficult to insert but successful using lubricating jelly inserted directly into the urethra. At this time, a Veress needle was used to insufflate the abdominal cavity and our standard 6 port placement was utilized. A camera port was placed supraumbilically and the remainer of ports were placed under optical visualization. The [employer] robot was docked to the patient. The bladder was dropped off the intra-abdominal wall between the median umbilical ligaments. The periprostatic fat was dissected from the anterior surface of the prostate and sent off the table for specimen. The endopelvic fascia and sharply incised. The puboprostatic ligaments were divided. The dorsal venous complex was ligated with an 0 Vicryl suture ligature. The dorsal vein was supported to the symphysis pubis with the vicryl stitch in a tension free fashion. The bladder neck was  from the prostatic base with the judicious use of monopolar and bipolar   cautery. A small median lobe was noted. In the posterior plane, we divided the vas deferens and dissected the seminal vesicles from the posterior plane. Denonvilliers fascia was sharply incised. We worked our way toward the apex anterior to the rectum. We then divided the vascular pedicles between clips. We worked our way toward the apex where the dorsal venous complex was divided. The anterior and posterior urethra were   mobilized and the Denonvilliers fascia were sharply incised. An excellent urethral stump was noted. The bladder neck was a bit large due to a small medial lobe. At this time, with the rectourethralis muscle detached, the prostate, seminal vesicles and vas deferens were placed in an   Endo Catch sac. Hemostasis was maintained by several small 4-0 vicryl suture ligatures around the neurovascular bundle. At this time, the   rectum was inspected. There was no gross injury. Then, we reapproximated Denonvilliers fascia with a 3-0 V-Loc suture.  At this   point, we began our circumferential anastomosis to the urethra and the bladder. This posterior portion was done and the ureter was out of harm's   way. The bladder neck was reconstructed midline anteriorly with single a 2-0 Vicryl figure of eight suture. At this time, we completed the circumferential anastomosis with a V-Loc suture and it was tied. A 16-Armenian Pearson catheter was placed as a final Pearson catheter and the bladder was filled. There was no evidence of a leak or extravasation, and at this time we left this to gravity drainage. All ports removed under direct visualizationThe prostate was extracted through the midline incision by extending the skin incision and fascia and then closing it with #1 PDS . Skin was closed with 4-0 Monocryl and Dermabond. The patient was brought awake   and alert to recovery in stable condition, tolerated the procedure well. Sridevi Hicks MD PGY-4  Urology Resident      Jameson Arizmendi.  Lavonne Davenport MD FACS  Professor of Urology   [facility]  [facility]  Director, Endourology Fellowship  [place] [address] [phone] ext [phone] (office)  [phone] fax

## 2018-02-07 NOTE — ANESTHESIA POSTPROCEDURE EVALUATION
Post-Anesthesia Evaluation and Assessment    Patient: Nas Page MRN: [id number]  SSN: [id number]    YOB: 1955  Age: 60 y.o. Sex: male       Cardiovascular Function/Vital Signs  Visit Vitals    /74    Pulse 80    Temp 36.7 °C (98 °F)    Resp 16    Ht 5' 9.5\" (1.765 m)    Wt 77.3 kg (170 lb 6 oz)    SpO2 97%    BMI 24.8 kg/m2       Patient is status post general anesthesia for Procedure(s):  davinci laparoscopic radical PROSTATECTOMY,  ROBOTIC ASSISTED. Nausea/Vomiting: None    Postoperative hydration reviewed and adequate. Pain:  Pain Scale 1: Numeric (0 - 10) (02/07/18 1545)  Pain Intensity 1: 7 (02/07/18 1545)   Managed    Neurological Status:   Neuro (WDL): (P) Within Defined Limits (02/07/18 1335)   At baseline    Mental Status and Level of Consciousness: Arousable    Pulmonary Status:   O2 Device: Oxygen mask (02/07/18 1342)   Adequate oxygenation and airway patent    Complications related to anesthesia: None    Post-anesthesia assessment completed.  No concerns    Signed By: Virgil Downing MD     February 7, 2018

## 2018-02-07 NOTE — H&P
Patient seen and evaluated. History reviewed  Labs reviewed   Images reviewed  Questions answered  No changes in history or physical exam    Viviane Dallas MD FACS  Professor of Urology   [facility]  Urology [facility] [place]   Director, Endourology Fellowship  [place] [phone] ext [phone] (office)  [phone] fax

## 2018-02-08 VITALS
SYSTOLIC BLOOD PRESSURE: 131 MMHG | HEART RATE: 86 BPM | DIASTOLIC BLOOD PRESSURE: 82 MMHG | OXYGEN SATURATION: 99 % | RESPIRATION RATE: 16 BRPM | HEIGHT: 70 IN | TEMPERATURE: 98.1 F | BODY MASS INDEX: 24.39 KG/M2 | WEIGHT: 170.38 LBS

## 2018-02-08 LAB
ANION GAP SERPL CALC-SCNC: 10 MMOL/L (ref 3–18)
BUN SERPL-MCNC: 17 MG/DL (ref 7–18)
BUN/CREAT SERPL: 15 (ref 12–20)
CALCIUM SERPL-MCNC: 9 MG/DL (ref 8.5–10.1)
CHLORIDE SERPL-SCNC: 96 MMOL/L (ref 100–108)
CO2 SERPL-SCNC: 29 MMOL/L (ref 21–32)
CREAT SERPL-MCNC: 1.12 MG/DL (ref 0.6–1.3)
ERYTHROCYTE [DISTWIDTH] IN BLOOD BY AUTOMATED COUNT: 12.8 % (ref 11.6–14.5)
GLUCOSE SERPL-MCNC: 104 MG/DL (ref 74–99)
HCT VFR BLD AUTO: 39.3 % (ref 36–48)
HGB BLD-MCNC: 13.2 G/DL (ref 13–16)
MCH RBC QN AUTO: 31 PG (ref 24–34)
MCHC RBC AUTO-ENTMCNC: 33.6 G/DL (ref 31–37)
MCV RBC AUTO: 92.3 FL (ref 74–97)
PLATELET # BLD AUTO: 217 K/UL (ref 135–420)
PMV BLD AUTO: 9.8 FL (ref 9.2–11.8)
POTASSIUM SERPL-SCNC: 4.3 MMOL/L (ref 3.5–5.5)
RBC # BLD AUTO: 4.26 M/UL (ref 4.7–5.5)
SODIUM SERPL-SCNC: 135 MMOL/L (ref 136–145)
WBC # BLD AUTO: 9.2 K/UL (ref 4.6–13.2)

## 2018-02-08 PROCEDURE — 74011250637 HC RX REV CODE- 250/637: Performed by: UROLOGY

## 2018-02-08 PROCEDURE — 77030010545

## 2018-02-08 PROCEDURE — 74011250636 HC RX REV CODE- 250/636: Performed by: UROLOGY

## 2018-02-08 PROCEDURE — 74011000250 HC RX REV CODE- 250: Performed by: UROLOGY

## 2018-02-08 PROCEDURE — 36415 COLL VENOUS BLD VENIPUNCTURE: CPT | Performed by: UROLOGY

## 2018-02-08 PROCEDURE — 90471 IMMUNIZATION ADMIN: CPT

## 2018-02-08 PROCEDURE — 80048 BASIC METABOLIC PNL TOTAL CA: CPT | Performed by: UROLOGY

## 2018-02-08 PROCEDURE — 85027 COMPLETE CBC AUTOMATED: CPT | Performed by: UROLOGY

## 2018-02-08 PROCEDURE — 90686 IIV4 VACC NO PRSV 0.5 ML IM: CPT | Performed by: UROLOGY

## 2018-02-08 RX ORDER — DOCUSATE SODIUM 100 MG/1
100 CAPSULE, LIQUID FILLED ORAL DAILY
Status: DISCONTINUED | OUTPATIENT
Start: 2018-02-08 | End: 2018-02-08 | Stop reason: HOSPADM

## 2018-02-08 RX ADMIN — HEPARIN SODIUM 5000 UNITS: 5000 INJECTION, SOLUTION INTRAVENOUS; SUBCUTANEOUS at 10:35

## 2018-02-08 RX ADMIN — INFLUENZA VIRUS VACCINE 0.5 ML: 15; 15; 15; 15 SUSPENSION INTRAMUSCULAR at 11:05

## 2018-02-08 RX ADMIN — DOCUSATE SODIUM 100 MG: 100 CAPSULE, LIQUID FILLED ORAL at 10:34

## 2018-02-08 RX ADMIN — WATER 1 G: 1 INJECTION INTRAMUSCULAR; INTRAVENOUS; SUBCUTANEOUS at 06:33

## 2018-02-08 RX ADMIN — OXYBUTYNIN CHLORIDE 5 MG: 5 TABLET ORAL at 06:34

## 2018-02-08 RX ADMIN — Medication 10 ML: at 07:52

## 2018-02-08 RX ADMIN — SODIUM CHLORIDE, SODIUM LACTATE, POTASSIUM CHLORIDE, AND CALCIUM CHLORIDE 125 ML/HR: 600; 310; 30; 20 INJECTION, SOLUTION INTRAVENOUS at 07:55

## 2018-02-08 NOTE — PROGRESS NOTES
Problem: Falls - Risk of  Goal: *Absence of Falls  Document Trista Fall Risk and appropriate interventions in the flowsheet.    Outcome: Progressing Towards Goal  Fall Risk Interventions:  Mobility Interventions: Patient to call before getting OOB         Medication Interventions: Patient to call before getting OOB    Elimination Interventions: Call light in reach

## 2018-02-08 NOTE — DISCHARGE SUMMARY
Discharge Summary    Patient: Shelia Guy               Sex: male          DOA: 2/7/2018         YOB: 1955      Age:  60 y.o.        LOS:  LOS: 1 day                Admit Date: 2/7/2018    Discharge Date: 2/8/2018    Admission Diagnoses: prostate cancer  c61;Prostate cancer [facility])    Discharge Diagnoses:    Problem List as of 2/8/2018  Date Reviewed: 12/5/2017          Codes Class Noted - Resolved    Prostate cancer [facility]) ICD-10-CM: Jagruti Issac  ICD-9-CM: 185  12/5/2017 - Present              Discharge Condition: Stable    Hospital Course: Unremarkable: See chart for further detials       Progress Note    Patient: Shelia Guy MRN: [id number]  SSN: [id number]    YOB: 1955  Age: 60 y.o.   Sex: male      Admit Date: 2/7/2018    LOS: 1 day     Subjective:     C/o mild abd pain, otherwise looks good    Objective:     Vitals:    02/07/18 1619 02/07/18 2136 02/08/18 0507 02/08/18 0825   BP: 120/74 113/75 112/73 131/82   Pulse: 80 89 77 86   Resp: 16 16 16 16   Temp: 98 °F (36.7 °C) 97.3 °F (36.3 °C) 97.5 °F (36.4 °C) 98.1 °F (36.7 °C)   SpO2: 97% 98% 97% 99%   Weight:       Height:            Intake and Output:  Current Shift: 02/08 0701 - 02/08 1900  In: -   Out: 1150 [Urine:1150]  Last three shifts: 02/06 1901 - 02/08 0700  In: 4133.3 [P.O.:90; I.V.:4043.3]  Out: 2750 [Urine:1750]    Physical Exam:   GENERAL: alert, cooperative, no distress, appears stated age  LUNG: unlabored  HEART: regular rate and rhythm  ABDOMEN: Soft, Non tender  EXTREMITIES:  extremities normal, atraumatic, no cyanosis or edema  SKIN: Normal.  PSYCHIATRIC: non focal  INCISION: clean, dry, intact    Lab/Data Review:    Lab Results   Component Value Date/Time    WBC 9.2 02/08/2018 04:07 AM    HGB 13.2 02/08/2018 04:07 AM    HCT 39.3 02/08/2018 04:07 AM    PLATELET 884 62/43/9903 04:07 AM    MCV 92.3 02/08/2018 04:07 AM       Lab Results   Component Value Date/Time    Sodium 135 (L) 02/08/2018 04:07 AM Potassium 4.3 02/08/2018 04:07 AM    Chloride 96 (L) 02/08/2018 04:07 AM    CO2 29 02/08/2018 04:07 AM    Anion gap 10 02/08/2018 04:07 AM    Glucose 104 (H) 02/08/2018 04:07 AM    BUN 17 02/08/2018 04:07 AM    Creatinine 1.12 02/08/2018 04:07 AM    BUN/Creatinine ratio 15 02/08/2018 04:07 AM    GFR est AA >60 02/08/2018 04:07 AM    GFR est non-AA >60 02/08/2018 04:07 AM    Calcium 9.0 02/08/2018 04:07 AM         POD 1 s/p RALP  Assessment:     Active Problems:    Prostate cancer ([address] (12/5/2017)        Plan:     Reg diet  UOOB  Stool softer  BP meds   Leg bag teaching, home after lunch if tolerates    No heavy lifting >15 lbs for 4 weeks. No driving on narcotics. OK to shower 48 hours after surgery. No baths or submerging incisions for 4 weeks until incisions are completely healed. Call MD if fever >101.5, signs of infection, intractable pain, nausea or vomiting, worsening shortness of breath or chest pain, significant bleeding, tyson not draining, painful leg swelling, or any questions or concerns. Only let a urologist do anything with you tyson catheter while it is in place. Signed By: Carmelita Godoy MD     February 8, 2018              Consults: None    Significant Diagnostic Studies: None    Discharge Medications:     Current Discharge Medication List      START taking these medications    Details   oxyCODONE-acetaminophen (PERCOCET) 5-325 mg per tablet Take 1 Tab by mouth every four (4) hours as needed for Pain. Max Daily Amount: 6 Tabs. May take 2 tabs for severe pain. Avoid driving while on this medication. Do not exceed 4000mg of acetaminophen per day  Qty: 30 Tab, Refills: 0      docusate sodium (COLACE) 100 mg capsule Take 1 Cap by mouth daily for 30 days. Continue while on narcotic pain medication to help prevent constipation  Qty: 30 Cap, Refills: 0      oxybutynin (DITROPAN) 5 mg tablet Take 1 Tab by mouth three (3) times daily as needed.  Indications: Bladder Spasms  Qty: 20 Tab, Refills: 0         CONTINUE these medications which have NOT CHANGED    Details   lisinopril-hydroCHLOROthiazide (PRINZIDE, ZESTORETIC) 20-25 mg per tablet Take 1 Tab by mouth daily. atorvastatin (LIPITOR) 10 mg tablet Take  by mouth daily. ASPIR-LOW 81 mg tablet       celecoxib (CELEBREX) 200 mg capsule Take  by mouth two (2) times a day. loratadine (CLARITIN) 10 mg tablet Take 10 mg by mouth. Activity: As tolerated    Diet: Regular    Wound Care:  May shower    Follow-up: As scheduled

## 2018-02-08 NOTE — PROGRESS NOTES
Assumed care of pt from night nurse Letitia Osler, RN. Received with patient up in chair, A&O x 4. Patient has pain and/or discomfort 7/10 in abdomen (lap sites), pain refused pain medication at this time, advised to please request when he wants. Patient verbalized understanding. Call light in reach. Encouraged to call for assistance, Pt verbalized understanding.

## 2018-02-08 NOTE — PROGRESS NOTES
Lexy   Discharge Planning/ Assessment      Reasons for Intervention:  Interviewed patient, he agrees to share his discharge information with his father, see below.  Demographic information verified. Sobeida John was independent prior to admission and sees Dr Bhupinder Cruz for his primary care needs.  Scott Mendoza is to return home.     High Risk Criteria  [x] Yes                []No   Physician Referral  [] Yes                [x]No        Date      Nursing Referral  [] Yes                [x]No        Date      Patient/Family Request  [] Yes                [x]No        Date          Resources:  Acey Tyler  Medicare  [] Yes                [x]No   Medicaid  [] Yes                [x]No   No Resources  [] Yes                [x]No   Private Insurance  [x] Yes                []No     Name/Phone Number      Other  [] Yes                [x]No        (i.e. Workman's Comp)          Prior Services:      Prior Services  [] Yes                [x]No   [address]  [] Yes                [x]No        Number of [address] [employer]  [] Yes                [x]No         Meals on Wheels  [] Yes                [x]No   Office on [facility] Name [facility]  [] Yes                [x]No        Rehab/VA Name      Other [address] [place] [address] obtained from [facility]  [] Child  [] Spouse   [] Significant Other/Partner   [] Friend      [] EMS    [] Nursing Home Chart          [x] Other: chart   Chart Review  [x] Yes                []No       Family/Support System:      Patient lives with  [] Alone    [] Spouse   [] Significant Other  [] Children  [] Caretaker   [x] Parent  [] Sibling     [] Other        Other Support System:      Is the patient responsible for care of others  [] Yes                [x]No   Information of person caring for patient on  discharge self   Managers financial affairs independently  [x] Yes                []No   If no, explain:          Status Prior to Admission:      Mental Status  [x] Awake  [x] Alert  [x] Oriented  [] Quiet/Calm [] Lethargic/Sedated   [] Disoriented  [] Restless/Anxious  [] Combative   [address]   Meal Preparation Ability  [x] Independent   [] Standby Assistance [facility] [address] Resident   [] Requires Assistance   Bowel/Bladder  [x] Continent  [] Catheter  [] Incontinent  [] Ostomy Self-Care    [] Urine Diversion Self-Care  [] Maximum Assistance     [] Total Assistance   Number of Persons needed for assistance      DME at home  [] Cane, Quad  [] Cane, Straight   [] Commode    [] Bathroom/Grab Bars  [] Hospital Bed  [] Nebulizer  [] Oxygen           [] Raised Toilet Seat  [] Shower Chair  [] Side Rails for Bed   [] Tub Transfer Bench   [] Walker, Rolling  [] Walker, Standard      [] Other:   Vendor          Treatment Presently Receiving:      Current Treatments  [] Chemotherapy  [] Dialysis  [] Insulin  [] IVAB [x] IVF   [x] O2  [] PCA   [x] PT   [] RT   [] Tube Feedings   [] Wound Care       Psychosocial Evaluation:      Verbalized Knowledge of Disease Process  [x] Patient                       [x]Family   Coping with Disease Process  [x] Patient                       [x]Family   Requires Further Counseling Coping with Disease Process  [] Patient                       []Family       Identified Projected Needs:  [address]  [] Yes                [x]No   Transportation  [] Yes                [x]No   Education  [] Yes                [x]No        Specific Education     Financial Counseling  [] Yes                [x]No   Inability to Care for Self/Will Require 24 hour care  [] Yes                [x]No   Pain Management  [] Yes                [x]No   Home Infusion Therapy  [] Yes                [x]No   Oxygen Therapy  [] Yes                [x]No   DME  [] Yes                [x]No   Long Term Care Placement  [] Yes                [x]No   Rehab  [x] Yes                []No   Physical Therapy  [x] Yes                []No   Needs Anticipated At This Time  [x] Yes                []No       Intra-Hospital Referral:  [address]  [] Yes                [x]No     [] Yes                [x]No   Patient Representative  [] Yes                [x]No   Staff for Teaching Needs  [] Yes                [x]No   Specialty Teaching Needs      Diabetic Educator  [] Yes                [x]No   Referral for Diabetic Educator Needed  [] Yes                [x]No  If Yes, place order for Nutritionist or Diabetic Consult       Tentative Discharge Plan:  [address] [facility] with No Services  [] Yes                [x]No   Home with [address]  [] Yes                [x]No        If Yes, specify type [facility] Program  [] Yes                [x]No        If Yes, specify type      Meals on Wheels  [] Yes                [x]No   Office of Aging  [] Yes                [x]No   NHP  [] Yes                [x]No   Return to the Nursing Home  [] Yes                [x]No   Rehab Therapy  [x] Yes                []No   Acute Rehab  [] Yes                [x]No   Subacute Rehab  [x] Yes                []No   Private Care  [] Yes                [x]No   Substance Abuse Referral  [] Yes                [x]No   Transportation  [] Yes                [x]No   Chore Service  [] Yes                [x]No   Inpatient Hospice  [] Yes                [x]No   OP RT  [] Yes                [x] No   OP Hemo  [] Yes                [x] No   OP PT  [] Yes                [x]No   Support Group  [] Yes                [x]No Reach to Recovery  [] Yes                [x]No   [facility] Appointment  [] Yes                [x]No   DME  [] Yes                [x]No   Comments      Name of D/C Planner or  Given to Patient or Family Preston Borjas RN   Phone Number [phone] [place] [address]   Date Feb 7, 2018   Time 300 pm   If you are discharged home, whom do you designate to participate in your discharge plan and receive any information needed?       Enter name of clari Lane  spouse        Phone # of clari Walker [address], [address]        Updated Feb 7, 2018        Patient refused to designate any           individual

## 2018-02-08 NOTE — PROGRESS NOTES
conducted an initial consultation and Spiritual Assessment for Fortino Em, who is a 60 y.o.,male. Patients Primary Language is: [place]. According to the patients EMR Shinto Affiliation is: Taoist. The reason the Patient came to the hospital is:   Patient Active Problem List    Diagnosis Date Noted    Prostate cancer [facility]) 12/05/2017        The  provided the following Interventions:  Initiated a relationship of care and support. Explored issues of lali, belief, spirituality and Moravian/ritual needs while hospitalized. Listened empathically. Provided information about Spiritual Care Services. Offered prayer and assurance of continued prayers on patient's behalf. Chart reviewed. The following outcomes were achieved:  Patient shared limited information about both their medical narrative and spiritual journey/beliefs. Patient processed feeling about current hospitalization. Patient expressed gratitude for 's visit. Assessment:  Patient does not have any Moravian/cultural needs that will affect patients preferences in health care. There are no further spiritual or Moravian issues which require intervention at this time. Plan:  Chaplains will continue to follow and will provide pastoral care on an as needed/requested basis.  recommends bedside caregivers page  on duty if patient shows signs of acute spiritual or emotional distress. Elenita Molina M.Div.   [place] [address]  [phone]

## 2018-02-08 NOTE — ROUTINE PROCESS
Bedside and Verbal shift change report given to [facility] (oncoming nurse) by Lisa Yu (offgoing nurse). Report included the following information SBAR, Kardex, MAR and Recent Results.

## 2018-02-08 NOTE — PROGRESS NOTES
Problem: Falls - Risk of  Goal: *Absence of Falls  Document Trista Fall Risk and appropriate interventions in the flowsheet.    Outcome: Progressing Towards Goal  Fall Risk Interventions:  Mobility Interventions: Patient to call before getting OOB         Medication Interventions: Patient to call before getting OOB, Teach patient to arise slowly    Elimination Interventions: Call light in reach, Toilet paper/wipes in reach

## 2018-02-08 NOTE — PROGRESS NOTES
Patient has designated his spouse to participate in his/her discharge plan and to receive any needed information.      Name:   Gisell Dominguez  spouse   Deannataurus ORTIZ [address] [facility]   Feb 7, 2018     Address:  Phone number:

## 2018-02-08 NOTE — PROGRESS NOTES
1953: Received patient in bed awake,alert and oriented x 4. No signs of distress,bed low and locked. Call bell within reach. Will monitor. 0330: Patient feels that he is going to have a BM but unable to have one and he states feel uncomfortable,encourage patient to ambulate to help with gas,Patient ask  me if the doctor ordered something for gas and some gel for his tyson but he cannot remember the name,nothing is ordered for both,made patient aware to let the doctor knows in AM ,verbalizes understanding. 0700: Report given to [facility] with CYDNEY,Kardex and MAR,made her aware of medication needed to order per patient. Call bell within reach. Will monitor.

## 2018-02-08 NOTE — DISCHARGE INSTRUCTIONS
DISCHARGE SUMMARY from Nurse    PATIENT INSTRUCTIONS:    After general anesthesia or intravenous sedation, for 24 hours or while taking prescription Narcotics:  · Limit your activities  · Do not drive and operate hazardous machinery  · Do not make important personal or business decisions  · Do  not drink alcoholic beverages  · If you have not urinated within 8 hours after discharge, please contact your surgeon on call. Report the following to your surgeon:  · Excessive pain, swelling, redness or odor of or around the surgical area  · Temperature over 100.5  · Nausea and vomiting lasting longer than 4 hours or if unable to take medications  · Any signs of decreased circulation or nerve impairment to extremity: change in color, persistent  numbness, tingling, coldness or increase pain  · Any questions    What to do at Home:  Recommended activity: No lifting, Driving, or Strenuous exercise until cleared by surgeon. If you experience any of the following symptoms severe pain, nausea and vomiting, fever above 100.5, bleeding or drainage from incision, shortness of breath, clots in urine, please follow up with Dr. Suma Drew. *  Please give a list of your current medications to your Primary Care Provider. *  Please update this list whenever your medications are discontinued, doses are      changed, or new medications (including over-the-counter products) are added. *  Please carry medication information at all times in case of emergency situations. These are general instructions for a healthy lifestyle:    No smoking/ No tobacco products/ Avoid exposure to second hand smoke  Surgeon General's Warning:  Quitting smoking now greatly reduces serious risk to your health.     Obesity, smoking, and sedentary lifestyle greatly increases your risk for illness    A healthy diet, regular physical exercise & weight monitoring are important for maintaining a healthy lifestyle    You may be retaining fluid if you have a history of heart failure or if you experience any of the following symptoms:  Weight gain of 3 pounds or more overnight or 5 pounds in a week, increased swelling in our hands or feet or shortness of breath while lying flat in bed. Please call your doctor as soon as you notice any of these symptoms; do not wait until your next office visit. Recognize signs and symptoms of STROKE:    F-face looks uneven    A-arms unable to move or move unevenly    S-speech slurred or non-existent    T-time-call 911 as soon as signs and symptoms begin-DO NOT go       Back to bed or wait to see if you get better-TIME IS BRAIN. Warning Signs of HEART ATTACK     Call 911 if you have these symptoms:   Chest discomfort. Most heart attacks involve discomfort in the center of the chest that lasts more than a few minutes, or that goes away and comes back. It can feel like uncomfortable pressure, squeezing, fullness, or pain.  Discomfort in other areas of the upper body. Symptoms can include pain or discomfort in one or both arms, the back, neck, jaw, or stomach.  Shortness of breath with or without chest discomfort.  Other signs may include breaking out in a cold sweat, nausea, or lightheadedness. Don't wait more than five minutes to call 911 - MINUTES MATTER! Fast action can save your life. Calling 911 is almost always the fastest way to get lifesaving treatment. Emergency Medical Services staff can begin treatment when they arrive -- up to an hour sooner than if someone gets to the hospital by car. The discharge information has been reviewed with the patient. The patient verbalized understanding. Discharge medications reviewed with the patient and appropriate educational materials and side effects teaching were provided. Patient armband removed and shredded.   ___________________________________________________________________________________________________________________________________

## 2018-02-27 NOTE — ANCILLARY DISCHARGE INSTRUCTIONS
[facility] [facility] [address]  Discharge Phone Call        After-Care Discharge Phone Call Questions: no answer     Were you able to get your prescriptions filled? Comment:      [] Yes  []No    Comment if answer is \"No\"   Are you taking your medication(s) as your doctor ordered? Do you understand the purpose of your medications? Comment:    [] Yes  []No    Comment if answer is \"No\"   Are you taking any other medications that are not on the list?  Comment:      [] Yes  []No    Comment if answer is \"Yes\"   Do you have any questions about your medications? Are you aware of potential side effects? Comment:    [] Yes  []No    Comment if answer is \"Yes\"   Did you make your follow-up appointments (if the hospital did not do this before  discharge)? Comment:    [] Yes  []No    Comment if answer is \"No\"   Is there any reason you might not be able to keep your follow-up appointments? Comment:     [] Yes  []No    Comment if answer is \"Yes\"   Do you have any questions about your care plan? Are you aware of what health problems to be alert for? Comment:    [] Yes  []No    Comment if answer is \"Yes\"   Do you have a good understanding of how you should manage your health? Comment:    [] Yes  []No    Comment if answer is \"Yes\"   Do you know which symptoms to watch for that would mean you would need to call your doctor right away? Comment:      [] Yes  []No    Comment if answer is \"No\"   Do you have any questions about the follow up process or any instructions that we have provided? Comment:    [] Yes  []No    Comment if answer is \"Yes\"   Did staff take your preferences into account?         [] Yes  []No    Comment if answer is \"Yes\"

## 2018-04-16 ENCOUNTER — DOCUMENTATION ONLY (OUTPATIENT)
Dept: ONCOLOGY | Age: 63
End: 2018-04-16

## 2018-04-16 NOTE — PROGRESS NOTES
Survivorship Care Plan mailed to patient with letter requesting pt call to review.  Faxed to PCP and scanned to [employer]

## 2018-05-03 ENCOUNTER — DOCUMENTATION ONLY (OUTPATIENT)
Dept: ONCOLOGY | Age: 63
End: 2018-05-03

## 2018-05-08 NOTE — PROGRESS NOTES
Attempted to contact patient to review Survivorship Care Plan, pt states he doesn't need to review it and if he has questions he will call his doctor. Pt thanked nurse. This will count as a refusal to review.

## 2021-08-03 PROBLEM — C61 PROSTATE CANCER (HCC): Status: RESOLVED | Noted: 2017-12-05 | Resolved: 2021-08-03

## (undated) DEVICE — Z DISCONTINUED USE 2639304 STRAP POS W3INXL30FT WIDE BK TO BK HK AND LOOP CLSR ALISTRP

## (undated) DEVICE — DRAPE KIT ACCS 4-ARM DISP -- DA VINCI

## (undated) DEVICE — NEEDLE HYPO 25GA L1.5IN BLU POLYPR HUB S STL REG BVL STR

## (undated) DEVICE — SUTURE MCRYL SZ 4-0 L18IN ABSRB UD L19MM PS-2 3/8 CIR PRIM Y496G

## (undated) DEVICE — KENDALL SCD EXPRESS SLEEVES, KNEE LENGTH, MEDIUM: Brand: KENDALL SCD

## (undated) DEVICE — DERMABOND SKIN ADH 0.7ML -- DERMABOND ADVANCED 12/BX

## (undated) DEVICE — BAG DRAIN URIN 2000ML LF STRL -- CONVERT TO ITEM 363123

## (undated) DEVICE — BLADELESS OPTICAL TROCAR WITH FIXATION CANNULA: Brand: VERSAPORT

## (undated) DEVICE — (D)CANN ENDOSCP BLDELSS 12MM -- DISC BY MFR USE ITEM 332253

## (undated) DEVICE — SOLUTION IV STRL H2O 500 ML AQUALITE POUR BTL

## (undated) DEVICE — STERILE LATEX POWDER-FREE SURGICAL GLOVESWITH NITRILE COATING: Brand: PROTEXIS

## (undated) DEVICE — LAPAROSCOPIC SCISSORS: Brand: EPIX LAPAROSCOPIC SCISSORS

## (undated) DEVICE — MAYO STAND COVER: Brand: CONVERTORS

## (undated) DEVICE — OBTRTR BLDELSS 8MM DISP -- DA VINCI - SNGL USE

## (undated) DEVICE — CARTRIDGE CLP LIG HEMLOK GRN --

## (undated) DEVICE — ELECTRO LUBE IS A SINGLE PATIENT USE DEVICE THAT IS INTENDED TO BE USED ON ELECTROSURGICAL ELECTRODES TO REDUCE STICKING.: Brand: KEY SURGICAL ELECTRO LUBE

## (undated) DEVICE — SYR IRR CATH TIP LR ADPT 70ML -- CONVERT TO ITEM 363120

## (undated) DEVICE — REM POLYHESIVE ADULT PATIENT RETURN ELECTRODE: Brand: VALLEYLAB

## (undated) DEVICE — CATHETER F BLLN 5CC 18FR 2 W HYDRGEL COAT LESS TRAUM LUB

## (undated) DEVICE — DRAPE,REIN 53X77,STERILE: Brand: MEDLINE

## (undated) DEVICE — PREP SKN CHLRAPRP 26ML TNT -- CONVERT TO ITEM 373320

## (undated) DEVICE — SUTURE VCRL SZ 2-0 L27IN ABSRB UD L26MM SH 1/2 CIR J417H

## (undated) DEVICE — SUTURE PDS II SZ 0 L27IN ABSRB VLT L36MM CT-1 1/2 CIR Z340H

## (undated) DEVICE — TIP COVER ACCESSORY

## (undated) DEVICE — CLIP INT XL YEL POLYMER HEM-O-LOK WECK

## (undated) DEVICE — SUTURE VCRL SZ 0 L27IN ABSRB UD L36MM CT-1 1/2 CIR J260H

## (undated) DEVICE — SUTURE V-LOC 90 3-0 L6IN ABSRB UD CV-23 L17MM 1/2 CIR VLOCM1904

## (undated) DEVICE — SUTURE VCRL SZ 0 L18IN ABSRB UD POLYGLACTIN 910 BRAID TIE J912G

## (undated) DEVICE — VISUALIZATION SYSTEM: Brand: CLEARIFY

## (undated) DEVICE — SPECIMEN RETRIEVAL POUCH: Brand: ENDO CATCH GOLD

## (undated) DEVICE — SUTURE V-LOC 180 SZ 3-0 L6IN ABSRB VLT CV-23 L17MM 1/2 CIR VLOCM0804

## (undated) DEVICE — INTENDED FOR TISSUE SEPARATION, AND OTHER PROCEDURES THAT REQUIRE A SHARP SURGICAL BLADE TO PUNCTURE OR CUT.: Brand: BARD-PARKER ® CARBON RIB-BACK BLADES

## (undated) DEVICE — DISPOSABLE SUCTION/IRRIGATOR TUBE SET WITH TIP: Brand: AHTO

## (undated) DEVICE — SHEET,DRAPE,70X100,STERILE: Brand: MEDLINE

## (undated) DEVICE — APPLIER CLP M/L SHFT DIA5MM 15 LIG LIGAMAX 5

## (undated) DEVICE — DRAPE,UTILITY,TAPE,15X26,STERILE: Brand: MEDLINE

## (undated) DEVICE — Device

## (undated) DEVICE — NDL PRT INJ NSAF BLNT 18GX1.5 --

## (undated) DEVICE — TELFA NON-ADHERENT ABSORBENT DRESSING: Brand: TELFA

## (undated) DEVICE — BLADELESS OPTICAL TROCAR WITH FIXATION CANNULA: Brand: VERSAONE

## (undated) DEVICE — CLIP LIG ABSRB HEM-LOK LG PUR --

## (undated) DEVICE — SYRINGE MED 3ML NDL 22GA L1.5IN PLAS N CTRL LUERLOCK TIP

## (undated) DEVICE — SUTURE VCRL SZ 3-0 L27IN ABSRB UD L17MM RB-1 1/2 CIR J215H

## (undated) DEVICE — LIGHT HANDLE: Brand: DEVON

## (undated) DEVICE — SYR 10ML CTRL LR LCK NSAF LF --

## (undated) DEVICE — SUTURE VCRL SZ 4-0 L27IN ABSRB UD L17MM RB-1 1/2 CIR J214H

## (undated) DEVICE — CATHETER URETH 20FR 5CC BLLN SIL ELASTMR F 2 W

## (undated) DEVICE — SYRINGE MED 20ML STD CLR PLAS LUERLOCK TIP N CTRL DISP

## (undated) DEVICE — BIPOLAR FORCEPS CORD,BANANA LEADS: Brand: VALLEYLAB